# Patient Record
Sex: FEMALE | Race: ASIAN
[De-identification: names, ages, dates, MRNs, and addresses within clinical notes are randomized per-mention and may not be internally consistent; named-entity substitution may affect disease eponyms.]

---

## 2019-01-15 NOTE — CONS
DATE OF CONSULTATION:  01/15/2019



ATTENDING PHYSICIAN:  Dr. Hill.



REASON FOR CONSULTATION:  The patient seen in pulmonary consultation at the

request of Dr. Hill for hypoxemia.



HISTORY OF PRESENT ILLNESS:  The patient is a 73-year-old female that was seen

by Dr. Hill in the office for increasing shortness of breath and cough.  She

was apparently exposed to RSV.  She became increasingly more short of breath. 

She normally wears 2 liters of oxygen supplementation.  She was found to have O2

saturation of 77% on room air.  She was admitted.  I was asked to see her in

consultation.  Denies fever, chills, nausea, vomiting.



PAST MEDICAL AND PAST SURGICAL HISTORY:  Type 2 diabetes, hypertension, coronary

artery bypass grafting, previous cardiac stenting.



REVIEW OF SYSTEMS:

CONSTITUTIONAL:  No fever or chills.

EYES:  No change in visual acuity.

HEENT:  No nasal congestion or sore throat.

PULMONARY:  As indicated above.

CARDIOVASCULAR:  No chest pain or pressure.

GASTROINTESTINAL:  No nausea, vomiting, diarrhea.

GENITOURINARY:  No dysuria or frequency.



CURRENT MEDICATIONS:  List was reviewed.



ALLERGIES:  No known drug allergies.



PHYSICAL EXAMINATION:

GENERAL:  The patient was in no respiratory distress, normally on 2 liters of

oxygen supplementation.

VITAL SIGNS:  She is up to 4 liters now.

HEENT:  Eyes, the sclerae were nonicteric.

NECK:  Jugular venous distention was not elevated.  No lymphadenopathy.

CHEST:  Full expansion.

LUNGS:  Coarse breath sounds, expiratory wheeze.

CARDIOVASCULAR:  Regular rate and rhythm with S1, S2, no S3.

ABDOMEN:  Soft, nontender, nondistended.

EXTREMITIES:  No clubbing, cyanosis or edema.

NEUROLOGIC:  The patient was awake, alert, following commands.  A detailed neuro

exam was not performed.



LABORATORY DATA:  Reviewed.  White count was normal.  Electrolytes were noted. 

Chest x-ray:  No acute infiltrate.



IMPRESSION:

1.  Acute on chronic hypoxemic respiratory failure.

2.  Acute nonspecific bronchitis.

3.  Recent RSV exposure.

4.  Significant wheezing, dyspnea secondary to above.



PLAN:

1.  Continue current support with oxygen supplementation.

2.  Nebulized treatments.

3.  Steroids.

4.  DVT prophylaxis.

5.  Continue home meds.



I do appreciate the privilege in sharing in this patient's care.

 



______________________________

GELY GROVER MD



DR:  ZOË/demarco  JOB#:  [id number] / [id number]

DD:  01/15/2019 17:36  DT:  01/15/2019 22:03

## 2019-01-15 NOTE — RAD
Portable chest, 1/15/2019:

 

HISTORY: Chest pain, shortness of breath

 

Comparison is made to a study from 2/26/2016. There has been a previous 

median sternotomy. The heart is mildly enlarged. There is calcific 

plaquing of the aorta. The pulmonary vascularity is normal. No pulmonary 

infiltrate is seen. There is no evidence of pleural fluid. Moderate 

spurring is present in the spine.

 

IMPRESSION:

1. Cardiomegaly and aortic atherosclerosis.

2. No acute cardiopulmonary abnormality is detected.

 

Electronically signed by: Rick Moritz, MD (1/15/2019 9:53 AM) [facility]

## 2019-01-15 NOTE — PDOC
PULMONARY PROGRESS NOTES


Vitals





Vital Signs








  Date Time  Temp Pulse Resp B/P (MAP) Pulse Ox O2 Delivery O2 Flow Rate FiO2


 


1/15/19 16:00 97.9 70 16 151/55 (87) 96   





 97.9       


 


1/15/19 14:07      Nasal Cannula 3.5 








Labs





Laboratory Tests








Test


 1/15/19


10:05 1/15/19


10:51 1/15/19


14:20 1/15/19


16:00


 


Influenza Type A Antigen


 Negative


(NEGATIVE) 


 


 





 


Influenza Type B Antigen


 Negative


(NEGATIVE) 


 


 





 


White Blood Count


 


 7.4 x10^3/uL


(4.0-11.0) 


 





 


Red Blood Count


 


 5.75 x10^6/uL


(3.50-5.40) 


 





 


Hemoglobin


 


 16.6 g/dL


(12.0-15.5) 


 





 


Hematocrit


 


 49.7 %


(36.0-47.0) 


 





 


Mean Corpuscular Volume  86 fL ()   


 


Mean Corpuscular Hemoglobin  29 pg (25-35)   


 


Mean Corpuscular Hemoglobin


Concent 


 33 g/dL


(31-37) 


 





 


Red Cell Distribution Width


 


 14.8 %


(11.5-14.5) 


 





 


Platelet Count


 


 97 x10^3/uL


(140-400) 


 





 


Neutrophils (%) (Auto)  78 % (31-73)   


 


Lymphocytes (%) (Auto)  12 % (24-48)   


 


Monocytes (%) (Auto)  9 % (0-9)   


 


Eosinophils (%) (Auto)  1 % (0-3)   


 


Basophils (%) (Auto)  0 % (0-3)   


 


Neutrophils # (Auto)


 


 5.8 x10^3uL


(1.8-7.7) 


 





 


Lymphocytes # (Auto)


 


 0.8 x10^3/uL


(1.0-4.8) 


 





 


Monocytes # (Auto)


 


 0.7 x10^3/uL


(0.0-1.1) 


 





 


Eosinophils # (Auto)


 


 0.1 x10^3/uL


(0.0-0.7) 


 





 


Basophils # (Auto)


 


 0.0 x10^3/uL


(0.0-0.2) 


 





 


Sodium Level


 


 141 mmol/L


(136-145) 


 





 


Potassium Level


 


 3.9 mmol/L


(3.5-5.1) 


 





 


Chloride Level


 


 101 mmol/L


() 


 





 


Carbon Dioxide Level


 


 31 mmol/L


(21-32) 


 





 


Anion Gap  9 (6-14)   


 


Blood Urea Nitrogen


 


 20 mg/dL


(7-20) 


 





 


Creatinine


 


 1.3 mg/dL


(0.6-1.0) 


 





 


Estimated GFR


(Cockcroft-Gault) 


 40.2 


 


 





 


BUN/Creatinine Ratio  15 (6-20)   


 


Glucose Level


 


 167 mg/dL


(70-99) 


 





 


Lactic Acid Level


 


 1.7 mmol/L


(0.4-2.0) 


 





 


Calcium Level


 


 8.8 mg/dL


(8.5-10.1) 


 





 


Magnesium Level


 


 1.8 mg/dL


(1.8-2.4) 


 





 


Total Bilirubin


 


 1.4 mg/dL


(0.2-1.0) 


 





 


Aspartate Amino Transf


(AST/SGOT) 


 25 U/L (15-37) 


 


 





 


Alanine Aminotransferase


(ALT/SGPT) 


 26 U/L (14-59) 


 


 





 


Alkaline Phosphatase


 


 78 U/L


() 


 





 


Troponin I Quantitative


 


 0.085 ng/mL


(0.000-0.055) 


 0.063 ng/mL


(0.000-0.055)


 


NT-Pro-B-Type Natriuretic


Peptide 


 890 pg/mL


(0-124) 


 





 


Total Protein


 


 7.4 g/dL


(6.4-8.2) 


 





 


Albumin


 


 3.4 g/dL


(3.4-5.0) 


 





 


Albumin/Globulin Ratio  0.9 (1.0-1.7)   


 


Triglycerides Level


 


 144 mg/dL


(0-150) 


 





 


Cholesterol Level


 


 252 mg/dL


(0-200) 


 





 


LDL Cholesterol, Calculated


 


 177 mg/dL


(0-100) 


 





 


VLDL Cholesterol, Calculated


 


 29 mg/dL


(0-40) 


 





 


Non-HDL Cholesterol Calculated


 


 206 mg/dL


(0-129) 


 





 


HDL Cholesterol


 


 46 mg/dL


(40-60) 


 





 


Cholesterol/HDL Ratio  5.5   


 


Thyroid Stimulating Hormone


(TSH) 


 1.079 uIU/mL


(0.358-3.74) 


 





 


Urine Collection Type   Unknown  


 


Urine Color   Jesusita  


 


Urine Clarity   Clear  


 


Urine pH   6.0  


 


Urine Specific Gravity   >=1.030  


 


Urine Protein


 


 


 >=300 mg/dL


(NEG-TRACE) 





 


Urine Glucose (UA)


 


 


 Negative mg/dL


(NEG) 





 


Urine Ketones (Stick)


 


 


 Negative mg/dL


(NEG) 





 


Urine Blood   Trace (NEG)  


 


Urine Nitrite   Negative (NEG)  


 


Urine Bilirubin   Negative (NEG)  


 


Urine Urobilinogen Dipstick


 


 


 1.0 mg/dL (0.2


mg/dL) 





 


Urine Leukocyte Esterase   Negative (NEG)  


 


Urine RBC   Occ /HPF (0-2)  


 


Urine WBC   Occ /HPF (0-4)  


 


Urine Squamous Epithelial


Cells 


 


 Many /LPF 


 





 


Urine Bacteria


 


 


 Few /HPF


(0-FEW) 





 


Urine Hyaline Casts   Few /HPF  


 


Urine Mucus   Marked /LPF  


 


Test


 1/15/19


17:00 


 


 





 


Glucose (Fingerstick)


 353 mg/dL


(70-99) 


 


 











Laboratory Tests








Test


 1/15/19


10:05 1/15/19


10:51 1/15/19


14:20 1/15/19


16:00


 


Influenza Type A Antigen


 Negative


(NEGATIVE) 


 


 





 


Influenza Type B Antigen


 Negative


(NEGATIVE) 


 


 





 


White Blood Count


 


 7.4 x10^3/uL


(4.0-11.0) 


 





 


Red Blood Count


 


 5.75 x10^6/uL


(3.50-5.40) 


 





 


Hemoglobin


 


 16.6 g/dL


(12.0-15.5) 


 





 


Hematocrit


 


 49.7 %


(36.0-47.0) 


 





 


Mean Corpuscular Volume  86 fL ()   


 


Mean Corpuscular Hemoglobin  29 pg (25-35)   


 


Mean Corpuscular Hemoglobin


Concent 


 33 g/dL


(31-37) 


 





 


Red Cell Distribution Width


 


 14.8 %


(11.5-14.5) 


 





 


Platelet Count


 


 97 x10^3/uL


(140-400) 


 





 


Neutrophils (%) (Auto)  78 % (31-73)   


 


Lymphocytes (%) (Auto)  12 % (24-48)   


 


Monocytes (%) (Auto)  9 % (0-9)   


 


Eosinophils (%) (Auto)  1 % (0-3)   


 


Basophils (%) (Auto)  0 % (0-3)   


 


Neutrophils # (Auto)


 


 5.8 x10^3uL


(1.8-7.7) 


 





 


Lymphocytes # (Auto)


 


 0.8 x10^3/uL


(1.0-4.8) 


 





 


Monocytes # (Auto)


 


 0.7 x10^3/uL


(0.0-1.1) 


 





 


Eosinophils # (Auto)


 


 0.1 x10^3/uL


(0.0-0.7) 


 





 


Basophils # (Auto)


 


 0.0 x10^3/uL


(0.0-0.2) 


 





 


Sodium Level


 


 141 mmol/L


(136-145) 


 





 


Potassium Level


 


 3.9 mmol/L


(3.5-5.1) 


 





 


Chloride Level


 


 101 mmol/L


() 


 





 


Carbon Dioxide Level


 


 31 mmol/L


(21-32) 


 





 


Anion Gap  9 (6-14)   


 


Blood Urea Nitrogen


 


 20 mg/dL


(7-20) 


 





 


Creatinine


 


 1.3 mg/dL


(0.6-1.0) 


 





 


Estimated GFR


(Cockcroft-Gault) 


 40.2 


 


 





 


BUN/Creatinine Ratio  15 (6-20)   


 


Glucose Level


 


 167 mg/dL


(70-99) 


 





 


Lactic Acid Level


 


 1.7 mmol/L


(0.4-2.0) 


 





 


Calcium Level


 


 8.8 mg/dL


(8.5-10.1) 


 





 


Magnesium Level


 


 1.8 mg/dL


(1.8-2.4) 


 





 


Total Bilirubin


 


 1.4 mg/dL


(0.2-1.0) 


 





 


Aspartate Amino Transf


(AST/SGOT) 


 25 U/L (15-37) 


 


 





 


Alanine Aminotransferase


(ALT/SGPT) 


 26 U/L (14-59) 


 


 





 


Alkaline Phosphatase


 


 78 U/L


() 


 





 


Troponin I Quantitative


 


 0.085 ng/mL


(0.000-0.055) 


 0.063 ng/mL


(0.000-0.055)


 


NT-Pro-B-Type Natriuretic


Peptide 


 890 pg/mL


(0-124) 


 





 


Total Protein


 


 7.4 g/dL


(6.4-8.2) 


 





 


Albumin


 


 3.4 g/dL


(3.4-5.0) 


 





 


Albumin/Globulin Ratio  0.9 (1.0-1.7)   


 


Triglycerides Level


 


 144 mg/dL


(0-150) 


 





 


Cholesterol Level


 


 252 mg/dL


(0-200) 


 





 


LDL Cholesterol, Calculated


 


 177 mg/dL


(0-100) 


 





 


VLDL Cholesterol, Calculated


 


 29 mg/dL


(0-40) 


 





 


Non-HDL Cholesterol Calculated


 


 206 mg/dL


(0-129) 


 





 


HDL Cholesterol


 


 46 mg/dL


(40-60) 


 





 


Cholesterol/HDL Ratio  5.5   


 


Thyroid Stimulating Hormone


(TSH) 


 1.079 uIU/mL


(0.358-3.74) 


 





 


Urine Collection Type   Unknown  


 


Urine Color   Jesusita  


 


Urine Clarity   Clear  


 


Urine pH   6.0  


 


Urine Specific Gravity   >=1.030  


 


Urine Protein


 


 


 >=300 mg/dL


(NEG-TRACE) 





 


Urine Glucose (UA)


 


 


 Negative mg/dL


(NEG) 





 


Urine Ketones (Stick)


 


 


 Negative mg/dL


(NEG) 





 


Urine Blood   Trace (NEG)  


 


Urine Nitrite   Negative (NEG)  


 


Urine Bilirubin   Negative (NEG)  


 


Urine Urobilinogen Dipstick


 


 


 1.0 mg/dL (0.2


mg/dL) 





 


Urine Leukocyte Esterase   Negative (NEG)  


 


Urine RBC   Occ /HPF (0-2)  


 


Urine WBC   Occ /HPF (0-4)  


 


Urine Squamous Epithelial


Cells 


 


 Many /LPF 


 





 


Urine Bacteria


 


 


 Few /HPF


(0-FEW) 





 


Urine Hyaline Casts   Few /HPF  


 


Urine Mucus   Marked /LPF  


 


Test


 1/15/19


17:00 


 


 





 


Glucose (Fingerstick)


 353 mg/dL


(70-99) 


 


 











Medications





Active Scripts








 Medications  Dose


 Route/Sig


 Max Daily Dose Days Date Category Dose


Instructions


 


 Vitamin D


  (Cholecalciferol


  (Vitamin D3))


 50,000 Unit


 Capsule  50,000 Unit


 PO WEEKLY


  30 1/15/19 Rx 


 


 Spiriva


  (Tiotropium


 Bromide) 18 Mcg


 Cap.w.dev  1 Cap


 IH DAILY


   1/15/19 Rx 


 


 Claritin


  (Loratadine) 10


 Mg Tablet  1 Tab


 PO DAILY


   1/15/19 Rx 


 


 Losartan


 Potassium 50 Mg


 Tablet  50 Mg


 PO DAILY


  30 1/15/19 Rx 


 


 Lasix


  (Furosemide) 20


 Mg Tablet  1 Tab


 PO DAILY


   1/15/19 Rx 


 


 Atorvastatin


 Calcium 40 Mg


 Tablet  1 Tab


 PO DAILY


   1/15/19 Rx 


 


 Vitamin D2


  (Ergocalciferol


  (Vitamin D2))


 50,000 Unit


 Capsule  1 Cap


 PO WEEKLY


   1/15/19 Reported 


 


 Proair Hfa


  (Albuterol


 Sulfate) 8.5 Gm


 Hfa.aer.ad  1 Puff


 INH QID PRN


   1/15/19 Reported 


 


 Januvia


  (Sitagliptin


 Phosphate) 50 Mg


 Tablet  1 Tab


 PO DAILY


   1/15/19 Reported 


 


 Metoprolol


 Tartrate 25 Mg


 Tablet  25 Mg


 PO BID


   2/28/16 Reported 


 


 Aspir 81


  (Aspirin) 81 Mg


 Tablet.dr  1 Tab


 PO DAILY


   2/28/16 Reported 


 


 Antivert


  (Meclizine Hcl)


 12.5 Mg Tablet  25 Mg


 PO PRN TID PRN


   2/28/16 Rx 


 


 Glipizide Er


  (Glipizide) 2.5


 Mg Tab.er.24  4 Tab


 PO DAILY


   2/26/16 Reported  Gave this morning


 Take again tomorrow morning


 


 Gabapentin **


  (Gabapentin) 100


 Mg Capsule  100 Mg


 PO PRN Q8HRS PRN


   2/26/16 Reported  Not given on this admission


 Take as previously directed


 


 Metformin Hcl Er


  (Metformin Hcl)


 500 Mg Tab.er.24h  2 Tab


 PO BID


   2/26/16 Reported  Gave this morning


 Take again tonight











Impression


.


DICTATED


AECOPD


AGREE WITHC CURRENT RX


THANKS











GELY GROVER MD Jonas 15, 2019 17:39

## 2019-01-15 NOTE — PHYS DOC
Past Medical History


Past Medical History:  Diabetes-Type II, Hypertension


Past Surgical History:  Coronary Bypass Surgery, Other


Additional Past Surgical Histo:  Cardiac Stents


Alcohol Use:  None


Drug Use:  None





Adult General


Chief Complaint


Chief Complaint:  SHORTNESS OF BREATH





HPI


HPI





73-year-old female was sent to the ER by her primary care physician for 

complaints of shortness of air and productive cough. Per daughter patient over 

the past few days has had increased shortness of air and was exposed to RSV. 

Patient has history of COPD and uses O2 PRN- she arrived with no oxygen on an 

initial O2 sat was 77% on room air. Pt denies CP, fever, N/V/D, or body aches.  





Pt's dgtr is translating as pt speaks no English.





Review of Systems


Review of Systems





Constitutional: Denies fever or chills []


Eyes: Denies change in visual acuity, redness, or eye pain []


HENT: Denies nasal congestion or sore throat []


Respiratory: Reports cough/SOA


Cardiovascular: Denies CP


GI: Denies abdominal pain, nausea, vomiting, bloody stools or diarrhea []


: Denies dysuria or hematuria []


Musculoskeletal: Denies back/neck pain or joint pain []


Integument: Denies rash, swelling or skin lesions []


Neurologic: Denies headache, focal weakness or sensory changes []


Endocrine: Denies polyuria or polydipsia []





All other systems were reviewed and found to be within normal limits, except as 

documented in this note.





Current Medications


Current Medications





Current Medications








 Medications


  (Trade)  Dose


 Ordered  Sig/Kenroy  Start Time


 Stop Time Status Last Admin


Dose Admin


 


 Albuterol/


 Ipratropium


  (Duoneb)  3 ml  1X  ONCE  1/15/19 11:15


 1/15/19 11:16 DC 1/15/19 11:17


3 ML


 


 Aspirin


  (Children'S


 Aspirin)  324 mg  1X  ONCE  1/15/19 11:45


 1/15/19 11:46 DC 1/15/19 12:00


324 MG


 


 Doxycycline


 Hyclate


  (Vibra-Tab)  100 mg  1X  ONCE  1/15/19 11:45


 1/15/19 11:46 DC 1/15/19 12:00


100 MG


 


 Methylprednisolone


 Sodium Succinate


  (SOLU-Medrol


 125MG VIAL)  125 mg  1X  ONCE  1/15/19 09:45


 1/15/19 09:46 DC 1/15/19 10:49


125 MG











Allergies


Allergies





Allergies








Coded Allergies Type Severity Reaction Last Updated Verified


 


  No Known Drug Allergies    2/26/16 No











Physical Exam


Physical Exam





Constitutional: Well developed, well nourished, no acute distress, non-toxic 

appearance. []


HENT: Normocephalic, atraumatic, bilateral ears normal, oropharynx moist, no 

oral exudates, nose normal. []


Eyes: Pupils equal, conjunctiva normal, no discharge. [] 


Neck: Normal range of motion, no tenderness, supple, no stridor. [] 


Cardiovascular: Heart rate regular rhythm, no murmur []


Lungs & Thorax:  Coarse rhonchi bilat. upper lung fields with slight expiratory 

wheeze rt upper lobe- diminished in bases. Resp. equal/nonlabored. Occasional 

dry cough during exam


Abdomen: Bowel sounds normal, soft, no tenderness


Skin: Warm, dry, no erythema, no rash. [] 


Back: No tenderness, no CVA tenderness. [] 


Extremities: No tenderness, no cyanosis, no clubbing, ROM intact, no edema. [] 


Neurologic: Alert and oriented X 3, normal motor function, normal sensory 

function, no focal deficits noted. []


Psychologic: Affect normal, judgement normal, mood normal. []





Current Patient Data


Vital Signs





 Vital Signs








  Date Time  Temp Pulse Resp B/P (MAP) Pulse Ox O2 Delivery O2 Flow Rate FiO2


 


1/15/19 11:45  68 20 175/86 (115) 95 Nasal Cannula 3.5 


 


1/15/19 09:44 99.7       





 99.7       








Lab Values





 Laboratory Tests








Test


 1/15/19


10:05 1/15/19


10:51


 


Influenza Type A Antigen


 Negative


(NEGATIVE) 





 


Influenza Type B Antigen


 Negative


(NEGATIVE) 





 


White Blood Count


 


 7.4 x10^3/uL


(4.0-11.0)


 


Red Blood Count


 


 5.75 x10^6/uL


(3.50-5.40)  H


 


Hemoglobin


 


 16.6 g/dL


(12.0-15.5)  H


 


Hematocrit


 


 49.7 %


(36.0-47.0)  H


 


Mean Corpuscular Volume


 


 86 fL ()





 


Mean Corpuscular Hemoglobin  29 pg (25-35)  


 


Mean Corpuscular Hemoglobin


Concent 


 33 g/dL


(31-37)


 


Red Cell Distribution Width


 


 14.8 %


(11.5-14.5)  H


 


Platelet Count


 


 97 x10^3/uL


(140-400)  L


 


Neutrophils (%) (Auto)  78 % (31-73)  H


 


Lymphocytes (%) (Auto)  12 % (24-48)  L


 


Monocytes (%) (Auto)  9 % (0-9)  


 


Eosinophils (%) (Auto)  1 % (0-3)  


 


Basophils (%) (Auto)  0 % (0-3)  


 


Neutrophils # (Auto)


 


 5.8 x10^3uL


(1.8-7.7)


 


Lymphocytes # (Auto)


 


 0.8 x10^3/uL


(1.0-4.8)  L


 


Monocytes # (Auto)


 


 0.7 x10^3/uL


(0.0-1.1)


 


Eosinophils # (Auto)


 


 0.1 x10^3/uL


(0.0-0.7)


 


Basophils # (Auto)


 


 0.0 x10^3/uL


(0.0-0.2)


 


Sodium Level


 


 141 mmol/L


(136-145)


 


Potassium Level


 


 3.9 mmol/L


(3.5-5.1)


 


Chloride Level


 


 101 mmol/L


()


 


Carbon Dioxide Level


 


 31 mmol/L


(21-32)


 


Anion Gap  9 (6-14)  


 


Blood Urea Nitrogen


 


 20 mg/dL


(7-20)


 


Creatinine


 


 1.3 mg/dL


(0.6-1.0)  H


 


Estimated GFR


(Cockcroft-Gault) 


 40.2  





 


BUN/Creatinine Ratio  15 (6-20)  


 


Glucose Level


 


 167 mg/dL


(70-99)  H


 


Lactic Acid Level


 


 1.7 mmol/L


(0.4-2.0)


 


Calcium Level


 


 8.8 mg/dL


(8.5-10.1)


 


Magnesium Level


 


 1.8 mg/dL


(1.8-2.4)


 


Total Bilirubin


 


 1.4 mg/dL


(0.2-1.0)  H


 


Aspartate Amino Transferase


(AST) 


 25 U/L (15-37)





 


Alanine Aminotransferase (ALT)


 


 26 U/L (14-59)





 


Alkaline Phosphatase


 


 78 U/L


()


 


Troponin I Quantitative


 


 0.085 ng/mL


(0.000-0.055)


 


NT-Pro-B-Type Natriuretic


Peptide 


 890 pg/mL


(0-124)  H


 


Total Protein


 


 7.4 g/dL


(6.4-8.2)


 


Albumin


 


 3.4 g/dL


(3.4-5.0)


 


Albumin/Globulin Ratio


 


 0.9 (1.0-1.7)


L


 


Triglycerides Level


 


 144 mg/dL


(0-150)


 


Cholesterol Level


 


 252 mg/dL


(0-200)  H


 


LDL Cholesterol, Calculated


 


 177 mg/dL


(0-100)  H


 


VLDL Cholesterol, Calculated


 


 29 mg/dL


(0-40)


 


Non-HDL Cholesterol Calculated


 


 206 mg/dL


(0-129)  H


 


HDL Cholesterol


 


 46 mg/dL


(40-60)


 


Cholesterol/HDL Ratio  5.5  


 


Thyroid Stimulating Hormone


(TSH) 


 1.079 uIU/mL


(0.358-3.74)





 Laboratory Tests


1/15/19 10:51








 Laboratory Tests


1/15/19 10:51











EKG


EKG


EKG obtained 01/15/19 at 1008


Interpreted by Dr. Mora





Sinus rhythm


Rate 70


No STEMI





Radiology/Procedures


Radiology/Procedures


PROCEDURE: CHEST AP ONLY





Portable chest, 1/15/2019:


 


HISTORY: Chest pain, shortness of breath


 


Comparison is made to a study from 2/26/2016. There has been a previous 


median sternotomy. The heart is mildly enlarged. There is calcific 


plaquing of the aorta. The pulmonary vascularity is normal. No pulmonary 


infiltrate is seen. There is no evidence of pleural fluid. Moderate 


spurring is present in the spine.


 


IMPRESSION:


1. Cardiomegaly and aortic atherosclerosis.


2. No acute cardiopulmonary abnormality is detected.


 


Electronically signed by: Rick Moritz, MD (1/15/2019 9:53 AM) [facility]














DICTATED and SIGNED BY:     MORITZ,RICK S MD


DATE:     01/15/19 0952





Course & Med Decision Making


Course & Med Decision Making


Pertinent Labs and Imaging studies reviewed. (See chart for details)





1200: Patient was sent to the ER by her primary care physician for complaints 

of shortness of air. Patient has had coarse lung sounds in upper lung fields 

with rotatory wheeze. Patient arrived to the ER without oxygen and initial 

saturation was 66-70%. Patient was placed on O2 via nasal cannula at 3 L and 

oxygenation improved to 88-90%. Patient was given 2 DuoNeb treatments and IV 

Solu-Medrol 125 mg. Patient had EKG with no acute ST elevation or STEMI her 

troponin was elevated at 0.085-with patient denying any chest pain. Chest x-ray 

with "Cardiomegaly and aortic atherosclerosis. No acute cardiopulmonary 

abnormality is detected" per report. Flu test neg. Patient had no improvement 

in lung sounds following treatments and so with elevated troponin discussed 

test results and plans for admission with patient's daughter who translated for 

pt. Pt will be given 324 mg of aspirin. Patient will also be started on 

doxycycline. Blood cultures were obtained. Patient had normal limits lactic at 

1.7 WBCs normal limits at 7.4 with platelets down at 97 bands on differential. 





1257: Spoke with Dr. Todd who called back for Dr. Hill pt's PCP- discussed pt

's case and admit plan. Will admit to [facility] for further monitoring. Will consult 

cardiology with admit orders for elevated troponin. Or


Staff Physician Addendum:


I was working in the ER during the course of this patient's visit.  I was 

available for consultation as needed, but I was not directly involved in the 

care of this patient.    I








Ronalon Disclaimer


Dragon Disclaimer


This electronic medical record was generated, in whole or in part, using a 

voice recognition dictation system.





Departure


Departure


Impression:  


 Primary Impression:  


 Dyspnea


 Additional Impressions:  


 COPD exacerbation


 Elevated troponin


Disposition:  09 ADMITTED AS INPATIENT


Admitting Physician:  Ivanna Hill


Condition:  STABLE


Referrals:  


IVANNA HILL MD (PCP)


Scripts


Cholecalciferol (Vitamin D3) (Vitamin D) 50,000 Unit Capsule


[address]21 UNIT PO WEEKLY for replacement for 30 Days, #3 CAP


   Prov: NADIYA GALLARDO MD         1/15/19 


Tiotropium Bromide (SPIRIVA) 18 Mcg Cap.w.dev


1 CAP IH DAILY for lungs, #30 CAP 3 Refills


   Prov: NADIYA GALLARDO MD         1/15/19 


Loratadine (CLARITIN) 10 Mg Tablet


1 TAB PO DAILY for allergies, #30 TAB 5 Refills


   Prov: NADIYA GALLARDO MD         1/15/19 


Losartan Potassium (LOSARTAN POTASSIUM) 50 Mg Tablet


50 MG PO DAILY for HYPERTENSION for 30 Days, #30 TAB


   Prov: NADIYA GALLARDO MD         1/15/19 


Furosemide (LASIX) 20 Mg Tablet


1 TAB PO DAILY for chf, #90 TAB 1 Refill


   Prov: NADIYA GALLARDO MD         1/15/19 


Atorvastatin Calcium (ATORVASTATIN CALCIUM) 40 Mg Tablet


1 TAB PO DAILY for cholesterol, #30 TAB 5 Refills


   Prov: NADIYA GALLARDO MD         1/15/19





Problem Qualifiers











ABAD ARGUETA Jonas 15, 2019 09:45


BATSHEVA MORA MD Jan 16, 2019 06:05

## 2019-01-15 NOTE — EKG
[facility]

              [address] [place] [address]

Test Date:    2019-01-15               Test Time:    10:08:57

Pat Name:     GINA JETT                 Department:   

Patient ID:   [id number]           Room:          

Gender:       F                        Technician:   

:          1945               Requested By: ABAD ARGUETA

Order Number: [id number]           Reading MD:   Brayan Harrington MD

                                 Measurements

Intervals                              Axis          

Rate:         69                       P:            

NH:                                    QRS:          25

QRSD:         82                       T:            41

QT:           414                                    

QTc:          450                                    

                           Interpretive Statements

SR

NON-SPECIFIC ST/T CHANGES

Electronically Signed On 1- 12:34:58 CST by Brayan Harrington MD

## 2019-01-15 NOTE — PDOC2
ZAC RANGEL APRN 1/15/19 1501:


CARDIAC CONSULT


DATE OF CONSULT


Date of Consult


DATE: 1/15/19 


TIME: 14:47





REASON FOR CONSULT


Reason for Consult:


elevated troponin





REFERRING PHYSICIAN


Referring Physician:


cyril





SOURCE


Source:  Caregiver (daughter), Chart review, Patient





HISTORY OF PRESENT ILLNESS


HISTORY OF PRESENT ILLNESS


This is a 72 yo female admitted for complains of SOA and productive cough with 

white sputum.  She has been exposed to RSV from her 1 yo nephew recently. Her 

SOA and cough started about Thursday last week and was getting worse. No 

reported fever. She also has COPD from significant secondary tobacco exposure 

at her younger years and has not been routinely using her advair and albuterol. 

Denies any chest pain or SOA prior to this event.  She has been having 

intermittent shapr kathleen pain since her cough started and again reproducible 

with cough.  Presently her SOA is better and no CP.  she has had aortic 

dissection repair and CABG in 2010 and has not seen her  cardiologist in a 

while otherwise she has been complaint with her medications.





PAST MEDICAL HISTORY


Cardiovascular:  CAD, HTN, Hyperlipidemia, Other (aortic dissection with repair 

in 2010)


CENTRAL NERVOUS SYSTEM:  Periperal neuropathy, Vertigo, Other (meningioma)


GI:  No pertinent hx


Musculoskeletal:  Osteoarthritis


Infectious disease:  No pertinent hx


ENT:  No pertinent hx


Renal/:  Chronic renal insuff (?)


Endocrine:  Diabetes (2)


Dermatology:  No pertinent hx





PAST SURGICAL HISTORY


Past Surgical History:  CABG, Other (PCI/stent)





FAMILY HISTORY


Family History


noncontributory





SOCIAL HISTORY


Smoke:  No


ALCOHOL:  none


Drugs:  None


Lives:  with Family





CURRENT MEDICATIONS


CURRENT MEDICATIONS





Current Medications








 Medications


  (Trade)  Dose


 Ordered  Sig/Kenroy


 Route


 PRN Reason  Start Time


 Stop Time Status Last Admin


Dose Admin


 


 Albuterol/


 Ipratropium


  (Duoneb)  3 ml  1X  ONCE


 NEB


   1/15/19 09:45


 1/15/19 09:46 DC 1/15/19 09:54





 


 Methylprednisolone


 Sodium Succinate


  (SOLU-Medrol


 125MG VIAL)  125 mg  1X  ONCE


 IV


   1/15/19 09:45


 1/15/19 09:46 DC 1/15/19 10:49





 


 Albuterol/


 Ipratropium


  (Duoneb)  3 ml  1X  ONCE


 NEB


   1/15/19 11:15


 1/15/19 11:16 DC 1/15/19 11:17





 


 Doxycycline


 Hyclate


  (Vibra-Tab)  100 mg  1X  ONCE


 PO


   1/15/19 11:45


 1/15/19 11:46 DC 1/15/19 12:00





 


 Aspirin


  ([facility]


 Aspirin)  324 mg  1X  ONCE


 PO


   1/15/19 11:45


 1/15/19 11:46 DC 1/15/19 12:00














ALLERGIES


ALLERGIES:  


Coded Allergies:  


     No Known Drug Allergies (Unverified , 2/26/16)





ROS


Review of System


limited: PATRICIA, language barrier





PHYSICAL EXAM


General:  Alert, Oriented X3, Cooperative, mild distress


HEENT:  Atraumatic, Mucous membr. moist/pink


Lungs:  Other (faint wheeze)


Heart:  Regular rate (SR), Normal S1, Normal S2, No murmurs


Abdomen:  Soft, No tenderness


Extremities:  No cyanosis, No edema


Skin:  No breakdown, No significant lesion


Neuro:  Normal speech, Sensation intact


Psych/Mental Status:  Mental status NL, Mood NL


MUSCULOSKELETAL:  Osteoarthritic changes both hands





VITALS


VITALS





Vital Signs








  Date Time  Temp Pulse Resp B/P (MAP) Pulse Ox O2 Delivery O2 Flow Rate FiO2


 


1/15/19 14:07  74 20 169/86 (113) 98 Nasal Cannula 3.5 


 


1/15/19 09:44 99.7       





 99.7       











LABS


Lab:





Laboratory Tests








Test


 1/15/19


10:05 1/15/19


10:51 1/15/19


14:20


 


Influenza Type A Antigen


 Negative


(NEGATIVE) 


 





 


Influenza Type B Antigen


 Negative


(NEGATIVE) 


 





 


White Blood Count


 


 7.4 x10^3/uL


(4.0-11.0) 





 


Red Blood Count


 


 5.75 x10^6/uL


(3.50-5.40) 





 


Hemoglobin


 


 16.6 g/dL


(12.0-15.5) 





 


Hematocrit


 


 49.7 %


(36.0-47.0) 





 


Mean Corpuscular Volume  86 fL ()  


 


Mean Corpuscular Hemoglobin  29 pg (25-35)  


 


Mean Corpuscular Hemoglobin


Concent 


 33 g/dL


(31-37) 





 


Red Cell Distribution Width


 


 14.8 %


(11.5-14.5) 





 


Platelet Count


 


 97 x10^3/uL


(140-400) 





 


Neutrophils (%) (Auto)  78 % (31-73)  


 


Lymphocytes (%) (Auto)  12 % (24-48)  


 


Monocytes (%) (Auto)  9 % (0-9)  


 


Eosinophils (%) (Auto)  1 % (0-3)  


 


Basophils (%) (Auto)  0 % (0-3)  


 


Neutrophils # (Auto)


 


 5.8 x10^3uL


(1.8-7.7) 





 


Lymphocytes # (Auto)


 


 0.8 x10^3/uL


(1.0-4.8) 





 


Monocytes # (Auto)


 


 0.7 x10^3/uL


(0.0-1.1) 





 


Eosinophils # (Auto)


 


 0.1 x10^3/uL


(0.0-0.7) 





 


Basophils # (Auto)


 


 0.0 x10^3/uL


(0.0-0.2) 





 


Sodium Level


 


 141 mmol/L


(136-145) 





 


Potassium Level


 


 3.9 mmol/L


(3.5-5.1) 





 


Chloride Level


 


 101 mmol/L


() 





 


Carbon Dioxide Level


 


 31 mmol/L


(21-32) 





 


Anion Gap  9 (6-14)  


 


Blood Urea Nitrogen


 


 20 mg/dL


(7-20) 





 


Creatinine


 


 1.3 mg/dL


(0.6-1.0) 





 


Estimated GFR


(Cockcroft-Gault) 


 40.2 


 





 


BUN/Creatinine Ratio  15 (6-20)  


 


Glucose Level


 


 167 mg/dL


(70-99) 





 


Lactic Acid Level


 


 1.7 mmol/L


(0.4-2.0) 





 


Calcium Level


 


 8.8 mg/dL


(8.5-10.1) 





 


Magnesium Level


 


 1.8 mg/dL


(1.8-2.4) 





 


Total Bilirubin


 


 1.4 mg/dL


(0.2-1.0) 





 


Aspartate Amino Transf


(AST/SGOT) 


 25 U/L (15-37) 


 





 


Alanine Aminotransferase


(ALT/SGPT) 


 26 U/L (14-59) 


 





 


Alkaline Phosphatase


 


 78 U/L


() 





 


Troponin I Quantitative


 


 0.085 ng/mL


(0.000-0.055) 





 


NT-Pro-B-Type Natriuretic


Peptide 


 890 pg/mL


(0-124) 





 


Total Protein


 


 7.4 g/dL


(6.4-8.2) 





 


Albumin


 


 3.4 g/dL


(3.4-5.0) 





 


Albumin/Globulin Ratio  0.9 (1.0-1.7)  


 


Urine Collection Type   Unknown 


 


Urine Color   Jesusita 


 


Urine Clarity   Clear 


 


Urine pH   6.0 


 


Urine Specific Gravity   >=1.030 


 


Urine Protein


 


 


 >=300 mg/dL


(NEG-TRACE)


 


Urine Glucose (UA)


 


 


 Negative mg/dL


(NEG)


 


Urine Ketones (Stick)


 


 


 Negative mg/dL


(NEG)


 


Urine Blood   Trace (NEG) 


 


Urine Nitrite   Negative (NEG) 


 


Urine Bilirubin   Negative (NEG) 


 


Urine Urobilinogen Dipstick


 


 


 1.0 mg/dL (0.2


mg/dL)


 


Urine Leukocyte Esterase   Negative (NEG) 


 


Urine RBC   Occ /HPF (0-2) 


 


Urine WBC   Occ /HPF (0-4) 


 


Urine Squamous Epithelial


Cells 


 


 Many /LPF 





 


Urine Bacteria


 


 


 Few /HPF


(0-FEW)


 


Urine Hyaline Casts   Few /HPF 


 


Urine Mucus   Marked /LPF 











ASSESSMENT/PLAN


ASSESSMENT/PLAN


1. AECOPD: recent RSV exposure.  Noncompliant with her inhalers. 


2. Elevated troponin: initial at 0.085, EKG SR no acute changes. Demand 

mediated due to hypoxia. 


3. Thrombocytopenia: possibly reactive. defer to PCP


4. HTN: controlled


5. HLP


6. DM2/DPN


7. Suspect CKD3 with macroalbuminuria


8. CAD: past CABG, clinically stable.


9. Pleuritic CP: reproducible with cough





Recommendations


1. TTE, lipids and TSH. 


2. Consult pulmonary. Solumedrol given in ED. 


3. Consider outpt stress test as an outpt unless significant changes to TTE. 

and will trend troponin 


4. Continue secondary prevention measures. 


5. May follow up with  cardiologist upon discharge.





ALESSIA VALVERDE MD 1/16/19 0915:


CARDIAC CONSULT


ASSESSMENT/PLAN


ASSESSMENT/PLAN


Patient seen and examined 1/15/19.  Agree with NP's assessment and plan.


Slight troponin elevation probably demand ischemia. CAD status clinically 

stable overall.


Check 2-D echo to assess LV function and rule out wall motion abnormalities.


Consider ischemic evaluation as an outpatient.


Continue current treatment for acute COPD exacerbation.


Thank you for your consultation.











ZAC RANGEL Jonas 15, 2019 15:01


ALESSIA VALVERDE MD Jan 16, 2019 09:15

## 2019-01-16 NOTE — PDOC3
DATE OF ADMISSION


Date of Admission


1/15/19





DATE OF DISCHARGE


Discharge Date


19





PROBLEM LIST


Problems:  


(1) COPD exacerbation


(2) Elevated troponin





CONSULTS


Consults


cardiology and pulmonary





PROCEDURES


Procedures


none





LABS


Labs





Laboratory Tests








Test


 1/15/19


16:00 1/15/19


17:00 1/15/19


19:00 1/15/19


20:56


 


Troponin I Quantitative


 0.063 ng/mL


(0.000-0.055) 


 0.068 ng/mL


(0.000-0.055) 





 


Glucose (Fingerstick)


 


 353 mg/dL


(70-99) 


 293 mg/dL


(70-99)


 


Test


 19


04:20 19


07:12 19


11:20 





 


White Blood Count


 5.4 x10^3/uL


(4.0-11.0) 


 


 





 


Red Blood Count


 5.43 x10^6/uL


(3.50-5.40) 


 


 





 


Hemoglobin


 15.9 g/dL


(12.0-15.5) 


 


 





 


Hematocrit


 46.9 %


(36.0-47.0) 


 


 





 


Mean Corpuscular Volume 86 fL ()    


 


Mean Corpuscular Hemoglobin 29 pg (25-35)    


 


Mean Corpuscular Hemoglobin


Concent 34 g/dL


(31-37) 


 


 





 


Red Cell Distribution Width


 14.9 %


(11.5-14.5) 


 


 





 


Platelet Count


 102 x10^3/uL


(140-400) 


 


 





 


Neutrophils (%) (Auto) 80 % (31-73)    


 


Lymphocytes (%) (Auto) 14 % (24-48)    


 


Monocytes (%) (Auto) 6 % (0-9)    


 


Eosinophils (%) (Auto) 0 % (0-3)    


 


Basophils (%) (Auto) 0 % (0-3)    


 


Neutrophils # (Auto)


 4.3 x10^3uL


(1.8-7.7) 


 


 





 


Lymphocytes # (Auto)


 0.8 x10^3/uL


(1.0-4.8) 


 


 





 


Monocytes # (Auto)


 0.3 x10^3/uL


(0.0-1.1) 


 


 





 


Eosinophils # (Auto)


 0.0 x10^3/uL


(0.0-0.7) 


 


 





 


Basophils # (Auto)


 0.0 x10^3/uL


(0.0-0.2) 


 


 





 


Sodium Level


 142 mmol/L


(136-145) 


 


 





 


Potassium Level


 3.9 mmol/L


(3.5-5.1) 


 


 





 


Chloride Level


 102 mmol/L


() 


 


 





 


Carbon Dioxide Level


 29 mmol/L


(21-32) 


 


 





 


Anion Gap 11 (6-14)    


 


Blood Urea Nitrogen


 35 mg/dL


(7-20) 


 


 





 


Creatinine


 1.8 mg/dL


(0.6-1.0) 


 


 





 


Estimated GFR


(Cockcroft-Gault) 27.6 


 


 


 





 


Glucose Level


 186 mg/dL


(70-99) 


 


 





 


Calcium Level


 9.2 mg/dL


(8.5-10.1) 


 


 





 


Glucose (Fingerstick)


 


 163 mg/dL


(70-99) 148 mg/dL


(70-99) 











Microbiology


1/15/19 Blood Culture - Preliminary, Resulted


          NO GROWTH AFTER 1 DAY





MEDICATIONS


Medications


Medications reviewed and reconciled for discharge.





CHEIF COMPLAINT


Cheif Complaint


She was brought to office by her daughter for SOA and was found to be 

significantly hypoxic despite a neb treatment and sent to ER and subsequently 

admitted.  She has had recent exposure to RSV.  She has oxygen at home and uses 

it intermittently.  In the ER her sats came up to 90 % on 2 liters of oxygen, 

she received Duoneb x 2 and IV solumedrol.  Overnight she has improved but 

remains on O2.  Her troponin was elevated initially which also contributed to 

her being hospitalized, she denied chest pain though





PAST MEDICAL HISTORY


PMH


type 2 diabetes with DPN


HTN


COPD


hypoxia


allergic rhinitis


AAA


Prevnar given 





PAST SURGICAL HISTORY


PSH


AAA dissection repair 2010





SOCIAL HISTORY


SH


never smoker, no drug use, , lives with  and daughter, [place]





FAMILY HISTORY


FH


parents , unknown cause, daughter healthy





ALLERGIES


Allergies





Allergies








Coded Allergies Type Severity Reaction Last Updated Verified


 


  No Known Drug Allergies    16 No











MEDICATIONS


Meds


Medications reviewed.





REVIEW OF SYSTEMS


ROS


A 14 point ROS was completed with the following noted as positive: see HPI





Other systems reviewed and negative.





PHYSICAL EXAM


Subjective


SOA


Objective


NAD, A&O


HEENT: no acute findings other than mild nasal congestion


Neck: supple, no adenopathy


Lungs: end expiratory wheezes, good respiratory effort, non labored


Heart: RRR, no murmur heard


Chest: no tenderness


Abdomen: soft and non tender, normal bowel sounds


Ext: no C/C/E


Skin: no rash, good turgor


MS: good tone and strength


Neuro: diminished sensation distal legs


Vital Signs





Vital Signs








  Date Time  Temp Pulse Resp B/P (MAP) Pulse Ox O2 Delivery O2 Flow Rate FiO2


 


19 11:18     98 Nasal Cannula 3.0 


 


19 11:09 98.2 68 20 137/77 (97)    





 98.2       








Assessment


Acute COPD exac, elevated troponin in a diabetic that has O2 at home, she has 

had recent RSV exposure





[facility] NOTE


[facility] Note


She has improved dramatically overnight with neb treatments, O2 and solumedrol, 

her blood sugar has been controlled, she has been evaluated by PT and strength 

is adequate.  She did have a slight rise in her creatinine overnight presumably 

from a dose of furosemide but is eating and drinking well now.  Cardiology and 

Pulmonary have seen her in consultation and have found no further need for 

hospitalization and recommending discharge.  She will be sent home on 12 day 

prednisone taper and Tessalon Perles





FOLLOW UP


F/U


1-2 weeks





DISPOSITION


Dispo


home. hold NADIYA Cueva MD 2019 14:39

## 2019-01-16 NOTE — PDOC
ZAC RANGEL APRN 1/16/19 1331:


CARDIO Progress Notes


Date and Time


Date of Service


1/16/2019


Time of Evaluation


1300





Subjective


Subjective:  No Chest Pain, No shortness of breath, No Palpitations, Other (SOA 

much better)





Vitals


Vitals





Vital Signs








  Date Time  Temp Pulse Resp B/P (MAP) Pulse Ox O2 Delivery O2 Flow Rate FiO2


 


1/16/19 11:18     98 Nasal Cannula 3.0 


 


1/16/19 11:09 98.2 68 20 137/77 (97)    





 98.2       








Weight


Weight [ ]





Input and Output


Intake and Output











Intake and Output 


 


 1/16/19





 07:01


 


Intake Total 700 ml


 


Output Total 150 ml


 


Balance 550 ml


 


 


 


Intake Oral 700 ml


 


Output Urine Total 150 ml


 


# Voids 1











Laboratory


Labs





Laboratory Tests








Test


 1/15/19


14:20 1/15/19


16:00 1/15/19


17:00 1/15/19


19:00


 


Urine Collection Type Unknown    


 


Urine Color Jesusita    


 


Urine Clarity Clear    


 


Urine pH 6.0    


 


Urine Specific Gravity >=1.030    


 


Urine Protein


 >=300 mg/dL


(NEG-TRACE) 


 


 





 


Urine Glucose (UA)


 Negative mg/dL


(NEG) 


 


 





 


Urine Ketones (Stick)


 Negative mg/dL


(NEG) 


 


 





 


Urine Blood Trace (NEG)    


 


Urine Nitrite Negative (NEG)    


 


Urine Bilirubin Negative (NEG)    


 


Urine Urobilinogen Dipstick


 1.0 mg/dL (0.2


mg/dL) 


 


 





 


Urine Leukocyte Esterase Negative (NEG)    


 


Urine RBC Occ /HPF (0-2)    


 


Urine WBC Occ /HPF (0-4)    


 


Urine Squamous Epithelial


Cells Many /LPF 


 


 


 





 


Urine Bacteria


 Few /HPF


(0-FEW) 


 


 





 


Urine Hyaline Casts Few /HPF    


 


Urine Mucus Marked /LPF    


 


Troponin I Quantitative


 


 0.063 ng/mL


(0.000-0.055) 


 0.068 ng/mL


(0.000-0.055)


 


Glucose (Fingerstick)


 


 


 353 mg/dL


(70-99) 





 


Test


 1/15/19


20:56 1/16/19


04:20 1/16/19


07:12 1/16/19


11:20


 


Glucose (Fingerstick)


 293 mg/dL


(70-99) 


 163 mg/dL


(70-99) 148 mg/dL


(70-99)


 


White Blood Count


 


 5.4 x10^3/uL


(4.0-11.0) 


 





 


Red Blood Count


 


 5.43 x10^6/uL


(3.50-5.40) 


 





 


Hemoglobin


 


 15.9 g/dL


(12.0-15.5) 


 





 


Hematocrit


 


 46.9 %


(36.0-47.0) 


 





 


Mean Corpuscular Volume  86 fL ()   


 


Mean Corpuscular Hemoglobin  29 pg (25-35)   


 


Mean Corpuscular Hemoglobin


Concent 


 34 g/dL


(31-37) 


 





 


Red Cell Distribution Width


 


 14.9 %


(11.5-14.5) 


 





 


Platelet Count


 


 102 x10^3/uL


(140-400) 


 





 


Neutrophils (%) (Auto)  80 % (31-73)   


 


Lymphocytes (%) (Auto)  14 % (24-48)   


 


Monocytes (%) (Auto)  6 % (0-9)   


 


Eosinophils (%) (Auto)  0 % (0-3)   


 


Basophils (%) (Auto)  0 % (0-3)   


 


Neutrophils # (Auto)


 


 4.3 x10^3uL


(1.8-7.7) 


 





 


Lymphocytes # (Auto)


 


 0.8 x10^3/uL


(1.0-4.8) 


 





 


Monocytes # (Auto)


 


 0.3 x10^3/uL


(0.0-1.1) 


 





 


Eosinophils # (Auto)


 


 0.0 x10^3/uL


(0.0-0.7) 


 





 


Basophils # (Auto)


 


 0.0 x10^3/uL


(0.0-0.2) 


 





 


Sodium Level


 


 142 mmol/L


(136-145) 


 





 


Potassium Level


 


 3.9 mmol/L


(3.5-5.1) 


 





 


Chloride Level


 


 102 mmol/L


() 


 





 


Carbon Dioxide Level


 


 29 mmol/L


(21-32) 


 





 


Anion Gap  11 (6-14)   


 


Blood Urea Nitrogen


 


 35 mg/dL


(7-20) 


 





 


Creatinine


 


 1.8 mg/dL


(0.6-1.0) 


 





 


Estimated GFR


(Cockcroft-Gault) 


 27.6 


 


 





 


Glucose Level


 


 186 mg/dL


(70-99) 


 





 


Calcium Level


 


 9.2 mg/dL


(8.5-10.1) 


 














Microbiology


Micro





Microbiology


1/15/19 Blood Culture - Preliminary, Resulted


          NO GROWTH AFTER 1 DAY





Physical Exam


HEENT:  Neck Supple W Full Motion


Chest:  Symmetric


LUNGS:  Other (diminished basilar wheeze. )


Heart:  S1S2, RRR (SR)


Abdomen:  Soft N/T


Extremities:  No Edema, No Calf Tenderness


Neurology:  alert, follow commands, other (language barrier)





Assessment


Assessment


1. AECOPD/moderate pulmonary HTN: recent RSV exposure.  Noncompliant with her 

inhalers. SOA better. per pulmonary


2. Elevated troponin: peaked at 0.085, EKG SR no acute changes. Demand mediated 

due to hypoxia. EF >70%


3. Thrombocytopenia: possibly reactive. defer to PCP


4. HTN: controlled


5. HLP: not on goal


6. DM2/DPN


7. BELLO on CKD3 with macroalbuminuria: prerenal. Cr at 1.8 per PCP


8. CAD: past CABG, clinically stable.


9. Pleuritic CP: reproducible with cough


10. Mild valvular insufficiency: mild AI/MR








Recommendations


1. Consider outpt stress test as an outpt and will defer to her  cardiologist


2. Increase lipitor


3. Continue secondary prevention measures. 


4. May follow up with  cardiologist upon discharge.





ALESSIA VALVERDE MD 1/16/19 2040:


CARDIO Progress Notes


Assessment


Assessment


Patient seen and examined. Agree with NP's assessment and plan


2D echo showed normal LVF without any WMA


Slight trop elevation probably demand ischemia


We will consider ischemic evaluation as outpatient











ZAC RANGEL Jan 16, 2019 13:31


ALESSIA VALVERDE MD Jan 16, 2019 20:40

## 2019-01-16 NOTE — PDOC
PULMONARY PROGRESS NOTES


Subjective


PT NOT MORE SOA


Vitals





Vital Signs








  Date Time  Temp Pulse Resp B/P (MAP) Pulse Ox O2 Delivery O2 Flow Rate FiO2


 


1/16/19 07:00 97.9 61 20 161/58 (92) 97 Nasal Cannula 3.5 





 97.9       








ROS:  No Nausea, No Chest Pain, No Abdominal Pain, No Increase Cough


Lungs:  Wheezing


Cardiovascular:  S1, S2


Abdomen:  Soft


Neuro Exam:  Alert


Extremities:  No Edema


Skin:  Warm


Labs





Laboratory Tests








Test


 1/15/19


10:05 1/15/19


10:51 1/15/19


14:20 1/15/19


16:00


 


Influenza Type A Antigen


 Negative


(NEGATIVE) 


 


 





 


Influenza Type B Antigen


 Negative


(NEGATIVE) 


 


 





 


White Blood Count


 


 7.4 x10^3/uL


(4.0-11.0) 


 





 


Red Blood Count


 


 5.75 x10^6/uL


(3.50-5.40) 


 





 


Hemoglobin


 


 16.6 g/dL


(12.0-15.5) 


 





 


Hematocrit


 


 49.7 %


(36.0-47.0) 


 





 


Mean Corpuscular Volume  86 fL ()   


 


Mean Corpuscular Hemoglobin  29 pg (25-35)   


 


Mean Corpuscular Hemoglobin


Concent 


 33 g/dL


(31-37) 


 





 


Red Cell Distribution Width


 


 14.8 %


(11.5-14.5) 


 





 


Platelet Count


 


 97 x10^3/uL


(140-400) 


 





 


Neutrophils (%) (Auto)  78 % (31-73)   


 


Lymphocytes (%) (Auto)  12 % (24-48)   


 


Monocytes (%) (Auto)  9 % (0-9)   


 


Eosinophils (%) (Auto)  1 % (0-3)   


 


Basophils (%) (Auto)  0 % (0-3)   


 


Neutrophils # (Auto)


 


 5.8 x10^3uL


(1.8-7.7) 


 





 


Lymphocytes # (Auto)


 


 0.8 x10^3/uL


(1.0-4.8) 


 





 


Monocytes # (Auto)


 


 0.7 x10^3/uL


(0.0-1.1) 


 





 


Eosinophils # (Auto)


 


 0.1 x10^3/uL


(0.0-0.7) 


 





 


Basophils # (Auto)


 


 0.0 x10^3/uL


(0.0-0.2) 


 





 


Sodium Level


 


 141 mmol/L


(136-145) 


 





 


Potassium Level


 


 3.9 mmol/L


(3.5-5.1) 


 





 


Chloride Level


 


 101 mmol/L


() 


 





 


Carbon Dioxide Level


 


 31 mmol/L


(21-32) 


 





 


Anion Gap  9 (6-14)   


 


Blood Urea Nitrogen


 


 20 mg/dL


(7-20) 


 





 


Creatinine


 


 1.3 mg/dL


(0.6-1.0) 


 





 


Estimated GFR


(Cockcroft-Gault) 


 40.2 


 


 





 


BUN/Creatinine Ratio  15 (6-20)   


 


Glucose Level


 


 167 mg/dL


(70-99) 


 





 


Lactic Acid Level


 


 1.7 mmol/L


(0.4-2.0) 


 





 


Calcium Level


 


 8.8 mg/dL


(8.5-10.1) 


 





 


Magnesium Level


 


 1.8 mg/dL


(1.8-2.4) 


 





 


Total Bilirubin


 


 1.4 mg/dL


(0.2-1.0) 


 





 


Aspartate Amino Transf


(AST/SGOT) 


 25 U/L (15-37) 


 


 





 


Alanine Aminotransferase


(ALT/SGPT) 


 26 U/L (14-59) 


 


 





 


Alkaline Phosphatase


 


 78 U/L


() 


 





 


Troponin I Quantitative


 


 0.085 ng/mL


(0.000-0.055) 


 0.063 ng/mL


(0.000-0.055)


 


NT-Pro-B-Type Natriuretic


Peptide 


 890 pg/mL


(0-124) 


 





 


Total Protein


 


 7.4 g/dL


(6.4-8.2) 


 





 


Albumin


 


 3.4 g/dL


(3.4-5.0) 


 





 


Albumin/Globulin Ratio  0.9 (1.0-1.7)   


 


Triglycerides Level


 


 144 mg/dL


(0-150) 


 





 


Cholesterol Level


 


 252 mg/dL


(0-200) 


 





 


LDL Cholesterol, Calculated


 


 177 mg/dL


(0-100) 


 





 


VLDL Cholesterol, Calculated


 


 29 mg/dL


(0-40) 


 





 


Non-HDL Cholesterol Calculated


 


 206 mg/dL


(0-129) 


 





 


HDL Cholesterol


 


 46 mg/dL


(40-60) 


 





 


Cholesterol/HDL Ratio  5.5   


 


Thyroid Stimulating Hormone


(TSH) 


 1.079 uIU/mL


(0.358-3.74) 


 





 


Urine Collection Type   Unknown  


 


Urine Color   Jesusita  


 


Urine Clarity   Clear  


 


Urine pH   6.0  


 


Urine Specific Gravity   >=1.030  


 


Urine Protein


 


 


 >=300 mg/dL


(NEG-TRACE) 





 


Urine Glucose (UA)


 


 


 Negative mg/dL


(NEG) 





 


Urine Ketones (Stick)


 


 


 Negative mg/dL


(NEG) 





 


Urine Blood   Trace (NEG)  


 


Urine Nitrite   Negative (NEG)  


 


Urine Bilirubin   Negative (NEG)  


 


Urine Urobilinogen Dipstick


 


 


 1.0 mg/dL (0.2


mg/dL) 





 


Urine Leukocyte Esterase   Negative (NEG)  


 


Urine RBC   Occ /HPF (0-2)  


 


Urine WBC   Occ /HPF (0-4)  


 


Urine Squamous Epithelial


Cells 


 


 Many /LPF 


 





 


Urine Bacteria


 


 


 Few /HPF


(0-FEW) 





 


Urine Hyaline Casts   Few /HPF  


 


Urine Mucus   Marked /LPF  


 


Test


 1/15/19


17:00 1/15/19


19:00 1/15/19


20:56 1/16/19


04:20


 


Glucose (Fingerstick)


 353 mg/dL


(70-99) 


 293 mg/dL


(70-99) 





 


Troponin I Quantitative


 


 0.068 ng/mL


(0.000-0.055) 


 





 


White Blood Count


 


 


 


 5.4 x10^3/uL


(4.0-11.0)


 


Red Blood Count


 


 


 


 5.43 x10^6/uL


(3.50-5.40)


 


Hemoglobin


 


 


 


 15.9 g/dL


(12.0-15.5)


 


Hematocrit


 


 


 


 46.9 %


(36.0-47.0)


 


Mean Corpuscular Volume    86 fL () 


 


Mean Corpuscular Hemoglobin    29 pg (25-35) 


 


Mean Corpuscular Hemoglobin


Concent 


 


 


 34 g/dL


(31-37)


 


Red Cell Distribution Width


 


 


 


 14.9 %


(11.5-14.5)


 


Platelet Count


 


 


 


 102 x10^3/uL


(140-400)


 


Neutrophils (%) (Auto)    80 % (31-73) 


 


Lymphocytes (%) (Auto)    14 % (24-48) 


 


Monocytes (%) (Auto)    6 % (0-9) 


 


Eosinophils (%) (Auto)    0 % (0-3) 


 


Basophils (%) (Auto)    0 % (0-3) 


 


Neutrophils # (Auto)


 


 


 


 4.3 x10^3uL


(1.8-7.7)


 


Lymphocytes # (Auto)


 


 


 


 0.8 x10^3/uL


(1.0-4.8)


 


Monocytes # (Auto)


 


 


 


 0.3 x10^3/uL


(0.0-1.1)


 


Eosinophils # (Auto)


 


 


 


 0.0 x10^3/uL


(0.0-0.7)


 


Basophils # (Auto)


 


 


 


 0.0 x10^3/uL


(0.0-0.2)


 


Sodium Level


 


 


 


 142 mmol/L


(136-145)


 


Potassium Level


 


 


 


 3.9 mmol/L


(3.5-5.1)


 


Chloride Level


 


 


 


 102 mmol/L


()


 


Carbon Dioxide Level


 


 


 


 29 mmol/L


(21-32)


 


Anion Gap    11 (6-14) 


 


Blood Urea Nitrogen


 


 


 


 35 mg/dL


(7-20)


 


Creatinine


 


 


 


 1.8 mg/dL


(0.6-1.0)


 


Estimated GFR


(Cockcroft-Gault) 


 


 


 27.6 





 


Glucose Level


 


 


 


 186 mg/dL


(70-99)


 


Calcium Level


 


 


 


 9.2 mg/dL


(8.5-10.1)


 


Test


 1/16/19


07:12 


 


 





 


Glucose (Fingerstick)


 163 mg/dL


(70-99) 


 


 











Laboratory Tests








Test


 1/15/19


10:05 1/15/19


10:51 1/15/19


14:20 1/15/19


16:00


 


Influenza Type A Antigen


 Negative


(NEGATIVE) 


 


 





 


Influenza Type B Antigen


 Negative


(NEGATIVE) 


 


 





 


White Blood Count


 


 7.4 x10^3/uL


(4.0-11.0) 


 





 


Red Blood Count


 


 5.75 x10^6/uL


(3.50-5.40) 


 





 


Hemoglobin


 


 16.6 g/dL


(12.0-15.5) 


 





 


Hematocrit


 


 49.7 %


(36.0-47.0) 


 





 


Mean Corpuscular Volume  86 fL ()   


 


Mean Corpuscular Hemoglobin  29 pg (25-35)   


 


Mean Corpuscular Hemoglobin


Concent 


 33 g/dL


(31-37) 


 





 


Red Cell Distribution Width


 


 14.8 %


(11.5-14.5) 


 





 


Platelet Count


 


 97 x10^3/uL


(140-400) 


 





 


Neutrophils (%) (Auto)  78 % (31-73)   


 


Lymphocytes (%) (Auto)  12 % (24-48)   


 


Monocytes (%) (Auto)  9 % (0-9)   


 


Eosinophils (%) (Auto)  1 % (0-3)   


 


Basophils (%) (Auto)  0 % (0-3)   


 


Neutrophils # (Auto)


 


 5.8 x10^3uL


(1.8-7.7) 


 





 


Lymphocytes # (Auto)


 


 0.8 x10^3/uL


(1.0-4.8) 


 





 


Monocytes # (Auto)


 


 0.7 x10^3/uL


(0.0-1.1) 


 





 


Eosinophils # (Auto)


 


 0.1 x10^3/uL


(0.0-0.7) 


 





 


Basophils # (Auto)


 


 0.0 x10^3/uL


(0.0-0.2) 


 





 


Sodium Level


 


 141 mmol/L


(136-145) 


 





 


Potassium Level


 


 3.9 mmol/L


(3.5-5.1) 


 





 


Chloride Level


 


 101 mmol/L


() 


 





 


Carbon Dioxide Level


 


 31 mmol/L


(21-32) 


 





 


Anion Gap  9 (6-14)   


 


Blood Urea Nitrogen


 


 20 mg/dL


(7-20) 


 





 


Creatinine


 


 1.3 mg/dL


(0.6-1.0) 


 





 


Estimated GFR


(Cockcroft-Gault) 


 40.2 


 


 





 


BUN/Creatinine Ratio  15 (6-20)   


 


Glucose Level


 


 167 mg/dL


(70-99) 


 





 


Lactic Acid Level


 


 1.7 mmol/L


(0.4-2.0) 


 





 


Calcium Level


 


 8.8 mg/dL


(8.5-10.1) 


 





 


Magnesium Level


 


 1.8 mg/dL


(1.8-2.4) 


 





 


Total Bilirubin


 


 1.4 mg/dL


(0.2-1.0) 


 





 


Aspartate Amino Transf


(AST/SGOT) 


 25 U/L (15-37) 


 


 





 


Alanine Aminotransferase


(ALT/SGPT) 


 26 U/L (14-59) 


 


 





 


Alkaline Phosphatase


 


 78 U/L


() 


 





 


Troponin I Quantitative


 


 0.085 ng/mL


(0.000-0.055) 


 0.063 ng/mL


(0.000-0.055)


 


NT-Pro-B-Type Natriuretic


Peptide 


 890 pg/mL


(0-124) 


 





 


Total Protein


 


 7.4 g/dL


(6.4-8.2) 


 





 


Albumin


 


 3.4 g/dL


(3.4-5.0) 


 





 


Albumin/Globulin Ratio  0.9 (1.0-1.7)   


 


Triglycerides Level


 


 144 mg/dL


(0-150) 


 





 


Cholesterol Level


 


 252 mg/dL


(0-200) 


 





 


LDL Cholesterol, Calculated


 


 177 mg/dL


(0-100) 


 





 


VLDL Cholesterol, Calculated


 


 29 mg/dL


(0-40) 


 





 


Non-HDL Cholesterol Calculated


 


 206 mg/dL


(0-129) 


 





 


HDL Cholesterol


 


 46 mg/dL


(40-60) 


 





 


Cholesterol/HDL Ratio  5.5   


 


Thyroid Stimulating Hormone


(TSH) 


 1.079 uIU/mL


(0.358-3.74) 


 





 


Urine Collection Type   Unknown  


 


Urine Color   Jesusita  


 


Urine Clarity   Clear  


 


Urine pH   6.0  


 


Urine Specific Gravity   >=1.030  


 


Urine Protein


 


 


 >=300 mg/dL


(NEG-TRACE) 





 


Urine Glucose (UA)


 


 


 Negative mg/dL


(NEG) 





 


Urine Ketones (Stick)


 


 


 Negative mg/dL


(NEG) 





 


Urine Blood   Trace (NEG)  


 


Urine Nitrite   Negative (NEG)  


 


Urine Bilirubin   Negative (NEG)  


 


Urine Urobilinogen Dipstick


 


 


 1.0 mg/dL (0.2


mg/dL) 





 


Urine Leukocyte Esterase   Negative (NEG)  


 


Urine RBC   Occ /HPF (0-2)  


 


Urine WBC   Occ /HPF (0-4)  


 


Urine Squamous Epithelial


Cells 


 


 Many /LPF 


 





 


Urine Bacteria


 


 


 Few /HPF


(0-FEW) 





 


Urine Hyaline Casts   Few /HPF  


 


Urine Mucus   Marked /LPF  


 


Test


 1/15/19


17:00 1/15/19


19:00 1/15/19


20:56 1/16/19


04:20


 


Glucose (Fingerstick)


 353 mg/dL


(70-99) 


 293 mg/dL


(70-99) 





 


Troponin I Quantitative


 


 0.068 ng/mL


(0.000-0.055) 


 





 


White Blood Count


 


 


 


 5.4 x10^3/uL


(4.0-11.0)


 


Red Blood Count


 


 


 


 5.43 x10^6/uL


(3.50-5.40)


 


Hemoglobin


 


 


 


 15.9 g/dL


(12.0-15.5)


 


Hematocrit


 


 


 


 46.9 %


(36.0-47.0)


 


Mean Corpuscular Volume    86 fL () 


 


Mean Corpuscular Hemoglobin    29 pg (25-35) 


 


Mean Corpuscular Hemoglobin


Concent 


 


 


 34 g/dL


(31-37)


 


Red Cell Distribution Width


 


 


 


 14.9 %


(11.5-14.5)


 


Platelet Count


 


 


 


 102 x10^3/uL


(140-400)


 


Neutrophils (%) (Auto)    80 % (31-73) 


 


Lymphocytes (%) (Auto)    14 % (24-48) 


 


Monocytes (%) (Auto)    6 % (0-9) 


 


Eosinophils (%) (Auto)    0 % (0-3) 


 


Basophils (%) (Auto)    0 % (0-3) 


 


Neutrophils # (Auto)


 


 


 


 4.3 x10^3uL


(1.8-7.7)


 


Lymphocytes # (Auto)


 


 


 


 0.8 x10^3/uL


(1.0-4.8)


 


Monocytes # (Auto)


 


 


 


 0.3 x10^3/uL


(0.0-1.1)


 


Eosinophils # (Auto)


 


 


 


 0.0 x10^3/uL


(0.0-0.7)


 


Basophils # (Auto)


 


 


 


 0.0 x10^3/uL


(0.0-0.2)


 


Sodium Level


 


 


 


 142 mmol/L


(136-145)


 


Potassium Level


 


 


 


 3.9 mmol/L


(3.5-5.1)


 


Chloride Level


 


 


 


 102 mmol/L


()


 


Carbon Dioxide Level


 


 


 


 29 mmol/L


(21-32)


 


Anion Gap    11 (6-14) 


 


Blood Urea Nitrogen


 


 


 


 35 mg/dL


(7-20)


 


Creatinine


 


 


 


 1.8 mg/dL


(0.6-1.0)


 


Estimated GFR


(Cockcroft-Gault) 


 


 


 27.6 





 


Glucose Level


 


 


 


 186 mg/dL


(70-99)


 


Calcium Level


 


 


 


 9.2 mg/dL


(8.5-10.1)


 


Test


 1/16/19


07:12 


 


 





 


Glucose (Fingerstick)


 163 mg/dL


(70-99) 


 


 











Medications





Active Scripts








 Medications  Dose


 Route/Sig


 Max Daily Dose Days Date Category Dose


Instructions


 


 Vitamin D


  (Cholecalciferol


  (Vitamin D3))


 50,000 Unit


 Capsule  50,000 Unit


 PO WEEKLY


  30 1/15/19 Rx 


 


 Spiriva


  (Tiotropium


 Bromide) 18 Mcg


 Cap.w.dev  1 Cap


 IH DAILY


   1/15/19 Rx 


 


 Claritin


  (Loratadine) 10


 Mg Tablet  1 Tab


 PO DAILY


   1/15/19 Rx 


 


 Losartan


 Potassium 50 Mg


 Tablet  50 Mg


 PO DAILY


  30 1/15/19 Rx 


 


 Lasix


  (Furosemide) 20


 Mg Tablet  1 Tab


 PO DAILY


   1/15/19 Rx 


 


 Atorvastatin


 Calcium 40 Mg


 Tablet  1 Tab


 PO DAILY


   1/15/19 Rx 


 


 Vitamin D2


  (Ergocalciferol


  (Vitamin D2))


 50,000 Unit


 Capsule  1 Cap


 PO WEEKLY


   1/15/19 Reported 


 


 Proair Hfa


  (Albuterol


 Sulfate) 8.5 Gm


 Hfa.aer.ad  1 Puff


 INH QID PRN


   1/15/19 Reported 


 


 Januvia


  (Sitagliptin


 Phosphate) 50 Mg


 Tablet  1 Tab


 PO DAILY


   1/15/19 Reported 


 


 Metoprolol


 Tartrate 25 Mg


 Tablet  25 Mg


 PO BID


   2/28/16 Reported 


 


 Aspir 81


  (Aspirin) 81 Mg


 Tablet.dr  1 Tab


 PO DAILY


   2/28/16 Reported 


 


 Antivert


  (Meclizine Hcl)


 12.5 Mg Tablet  25 Mg


 PO PRN TID PRN


   2/28/16 Rx 


 


 Glipizide Er


  (Glipizide) 2.5


 Mg Tab.er.24  4 Tab


 PO DAILY


   2/26/16 Reported  Gave this morning


 Take again tomorrow morning


 


 Gabapentin **


  (Gabapentin) 100


 Mg Capsule  100 Mg


 PO PRN Q8HRS PRN


   2/26/16 Reported  Not given on this admission


 Take as previously directed


 


 Metformin Hcl Er


  (Metformin Hcl)


 500 Mg Tab.er.24h  2 Tab


 PO BID


   2/26/16 Reported  Gave this morning


 Take again tonight











Impression


.





IMPRESSION:


1.  Acute on chronic hypoxemic respiratory failure.


2.  Acute nonspecific bronchitis.


3.  Recent RSV exposure.


4.  Significant wheezing, dyspnea secondary to above.





Plan


.


HOME OK BY


SPOKE WITH FAMILY











GELY GROVER MD Jan 16, 2019 08:48

## 2019-01-16 NOTE — NUR
Discharge Note:



KETAN JETT



Discharge instructions and discharge home medications reviewed with Patient and a copy 
given. All questions have been answered and understanding verbalized. 



The following instructions and handouts were given: COPD, COPD exacerbation, Troponin



Discontinued lines and drains: 20G R-hand removed. Catheter intact. 



Patient discharged to home with self-care via  to private vehicle. Pt. son accepted 
discharge instuctions. Pt. dressed self and had a steady gait walking to  and from  to 
car.

## 2019-01-16 NOTE — CARD
MR#: [id number]

Account#: [id number]

Accession#: [id number]

Date of Study: 01/16/2019

Ordering Physician: ZAC RANGEL, 

Referring Physician: LEÓN AMBROCIO 

Tech: Tory Mireles RDCS





--------------- APPROVED REPORT --------------





EXAM: Two-dimensional and M-mode echocardiogram with Doppler and color Doppler.



Other Information 

Quality : GoodHR: 63bpm

Rhythm : NSR



INDICATION

CAD 



2D DIMENSIONS 

RVDd2.7 (2.9-3.5cm)Left Atrium(2D)3.9 (1.6-4.0cm)

IVSd1.3 (0.7-1.1cm)Aortic Root(2D)3.1 (2.0-3.7cm)

LVDd4.6 (3.9-5.9cm)LVOT Diameter2.3 (1.8-2.4cm)

PWd0.9 (0.7-1.1cm)LVDs2.4 (2.5-4.0cm)

FS (%) 47.8 %SV77.4 ml

LVEF(%)79.3 (>50%)



M-Mode DIMENSIONS 

Left Atrium(MM)3.90 (2.5-4.0cm)Aortic Root3.23 (2.2-3.7cm)



Aortic Valve

AoV Peak Brian.149.9cm/sAoV VTI32.3cm

AO Peak GR.9.0mmHgLVOT Peak Brian.100.3cm/s

AO Mean GR.5mmHgAVA (VMAX)2.67cm2

[place]   (VTI)2.83ij9WJ P 1/2 [id number]



Mitral Valve

MV E Dpbpjsmx113.0cm/sMV E Peak Gr.8mmHg

MV DECEL [id number] A Djgpvtjn84.8cm/s

MV E Mean Gr.2mmHgE/A  Ratio2.1

MV A [id number]



Pulmonary Valve

PV Peak Dgeusdkp717.2cm/s



Tricuspid Valve

TR P. Iugzeavl396bo/sRAP [id number]

TR Peak Gr.[id number]



Pulmonary Vein

S1 Nyviyjru56.3cm/sD2 Nvmkhozm87.3cm/s



 LEFT VENTRICLE 

The left ventricle is normal size. Proximal septal thickening is noted. The left ventricle systolic f
unction is hyperdynamic. The Ejection Fraction is >70%. There is normal LV segmental wall motion. Tra
nsmitral Doppler flow pattern is Grade II-pseudonormal filling dynamics.



 RIGHT VENTRICLE 

The right ventricle is normal size. There is normal right ventricular wall thickness. The right ventr
icular systolic function is normal.



 ATRIA 

The left atrium size is normal. The right atrium size is normal. The interatrial septum is intact wit
h no evidence for an atrial septal defect or patent foramen ovale as noted on 2-D or Doppler imaging.




 AORTIC VALVE 

The aortic valve is calcified but opens well. The aortic valve is trileaflet. Doppler and Color Flow 
revealed mild aortic regurgitation. There is no significant aortic valvular stenosis.



 MITRAL VALVE 

There is systolic anterior motion of the mitral valve. There is no evidence of mitral valve prolapse.
 There is no mitral valve stenosis. Doppler and Color-flow revealed mild mitral regurgitation.



 TRICUSPID VALVE 

The tricuspid valve is normal in structure and function. Doppler and Color Flow revealed trace tricus
pid regurgitation. There is moderate pulmonary hypertension. The PA pressure was estimated at 58 mmHg
. There is no tricuspid valve prolapse or vegetation. There is no tricuspid valve stenosis.



 PULMONIC VALVE 

The pulmonary valve is normal in structure and function. Doppler and Color Flow revealed mild pulmoni
c valvular regurgitation. There is no pulmonic valvular stenosis.



 GREAT VESSELS 

The aortic root is normal in size. The ascending aorta is normal in size. The IVC is normal in size a
nd collapses >50% with inspiration.



 PERICARDIAL EFFUSION 

There is no evidence of significant pericardial effusion.



Critical Notification

Critical Value: No



<Conclusion>

The left ventricle systolic function is hyperdynamic.

The Ejection Fraction is >70%.

There is normal LV segmental wall motion.

Transmitral Doppler flow pattern is Grade II-pseudonormal filling dynamics.

Mild aortic regurgitation.

Mild mitral regurgitation.

Trace tricuspid regurgitation.

There is moderate pulmonary hypertension.

The PA pressure was estimated at 58 mmHg.

There is no evidence of significant pericardial effusion.



Signed by : Donovan Lira, 

Electronically Approved : 01/16/2019 11:00:37

## 2019-11-04 NOTE — RAD
PORTABLE CHEST 1V

 

History: Right-sided chest pain.

 

Comparison: January 15, 2019

 

Findings:

Cardiomegaly, unchanged. Prior median sternotomy. No consolidation or 

pleural effusion. No pneumothorax.

 

Impression: 

1.  No acute cardiopulmonary process.

2.  Cardiomegaly, unchanged.

 

Electronically signed by: Johnathan Chapman DO (11/4/2019 5:18 PM) [id number]

## 2019-11-04 NOTE — PHYS DOC
Past Medical History


Past Medical History:  COPD, Diabetes-Type II, High Cholesterol, Hypertension


Past Surgical History:  Coronary Bypass Surgery, Other


Additional Past Surgical Histo:  Cardiac Stents - UNKNOWN NUMBER


Alcohol Use:  Rarely


Drug Use:  None





Adult General


Chief Complaint


Chief Complaint:  CHEST PAIN





HPI


HPI





Patient is a 74  year old Female who presents with 3 days of right-sided chest 

pain it's a burning sensation. Patient states when she gets up and moves is 

worse. Patient states it does not radiate. Patient states she also has some 

shortness of air. Patient also states that she has dizziness and blurred vision 

especially when she stands up but not at this time. She states it comes and 

goes. She states that dizziness and the blurred vision has been going on for 

months. Patient does have history of vertigo. Patient rates her pain a 10 out of

10. Patient denies abdominal pain, nausea, vomiting, diarrhea, syncope, 

headache.





Review of Systems


Review of Systems








Eyes: Blurred visual acuity, denies redness, or eye pain []


HENT: Denies nasal congestion or sore throat []


Respiratory: Denies cough. +shortness of breath []


Cardiovascular: Right sided burning chest pain


Neurologic: Dizziness. Denies headache, focal weakness or sensory changes []








All other systems were reviewed and found to be within normal limits, except as 

documented in this note.





Current Medications


Current Medications





Current Medications








 Medications


  (Trade)  Dose


 Ordered  Sig/Kenroy  Start Time


 Stop Time Status Last Admin


Dose Admin


 


 Aspirin


  (Jovanny Aspirin)  325 mg  1X  ONCE  19 16:45


 19 16:46 DC 19 17:56


325 MG


 


 Clonidine HCl


  (Catapres)  0.1 mg  1X  ONCE  19 17:00


 19 17:54 DC  





 


 Famotidine


  (Pepcid Vial)  20 mg  1X  ONCE  19 16:45


 19 16:46 DC 19 17:57


20 MG











Allergies


Allergies





Allergies








Coded Allergies Type Severity Reaction Last Updated Verified


 


  No Known Drug Allergies    16 No











Physical Exam


Physical Exam





Constitutional: Well developed, well nourished, no acute distress, non-toxic 

appearance. []


HENT: Normocephalic, atraumatic, bilateral external ears normal, oropharynx 

moist, no oral exudates, nose normal. []


Eyes: PERRLA, EOMI, conjunctiva normal, no discharge. [] 


Neck: Normal range of motion, no tenderness, supple, no stridor. [] 


Cardiovascular:Heart rate regular rhythm, no murmur. Tenderness with palpation 

to right chest.  []


Lungs & Thorax:  Bilateral breath sounds clear to auscultation []


Abdomen: Bowel sounds normal, soft, no tenderness, no masses, no pulsatile 

masses. [] 


Skin: Warm, dry, no erythema, no rash. [] 


Back: No tenderness, no CVA tenderness. [] 


Extremities: No tenderness, no cyanosis, no clubbing, ROM intact, no edema. [] 


Neurologic: Alert and oriented X 3, normal motor function, normal sensory 

function, no focal deficits noted. []


Psychologic: Affect normal, judgement normal, mood normal. []





Current Patient Data


Vital Signs





                                   Vital Signs








  Date Time  Temp Pulse Resp B/P (MAP) Pulse Ox O2 Delivery O2 Flow Rate FiO2


 


19 16:21 97.3 58 16 222/88 (132) 96 Room Air  





 97.3       








Lab Values





                                Laboratory Tests








Test


 19


16:50 19


17:35


 


White Blood Count


 7.6 x10^3/uL


(4.0-11.0) 





 


Red Blood Count


 5.28 x10^6/uL


(3.50-5.40) 





 


Hemoglobin


 15.2 g/dL


(12.0-15.5) 





 


Hematocrit


 45.2 %


(36.0-47.0) 





 


Mean Corpuscular Volume


 86 fL ()


 





 


Mean Corpuscular Hemoglobin 29 pg (25-35)   


 


Mean Corpuscular Hemoglobin


Concent 34 g/dL


(31-37) 





 


Red Cell Distribution Width


 15.1 %


(11.5-14.5)  H 





 


Platelet Count


 118 x10^3/uL


(140-400)  L 





 


Neutrophils (%) (Auto) 58 % (31-73)   


 


Lymphocytes (%) (Auto) 30 % (24-48)   


 


Monocytes (%) (Auto) 8 % (0-9)   


 


Eosinophils (%) (Auto) 3 % (0-3)   


 


Basophils (%) (Auto) 1 % (0-3)   


 


Neutrophils # (Auto)


 4.4 x10^3/uL


(1.8-7.7) 





 


Lymphocytes # (Auto)


 2.3 x10^3/uL


(1.0-4.8) 





 


Monocytes # (Auto)


 0.6 x10^3/uL


(0.0-1.1) 





 


Eosinophils # (Auto)


 0.2 x10^3/uL


(0.0-0.7) 





 


Basophils # (Auto)


 0.1 x10^3/uL


(0.0-0.2) 





 


Sodium Level


 


 147 mmol/L


(136-145)  H


 


Potassium Level


 


 4.1 mmol/L


(3.5-5.1)


 


Chloride Level


 


 106 mmol/L


()


 


Carbon Dioxide Level


 


 29 mmol/L


(21-32)


 


Anion Gap  12 (6-14)  


 


Blood Urea Nitrogen


 


 27 mg/dL


(7-20)  H


 


Creatinine


 


 1.4 mg/dL


(0.6-1.0)  H


 


Estimated GFR


(Cockcroft-Gault) 


 36.8  





 


BUN/Creatinine Ratio  19 (6-20)  


 


Glucose Level


 


 156 mg/dL


(70-99)  H


 


Calcium Level


 


 9.3 mg/dL


(8.5-10.1)


 


Total Bilirubin


 


 1.1 mg/dL


(0.2-1.0)  H


 


Aspartate Amino Transferase


(AST) 


 23 U/L (15-37)





 


Alanine Aminotransferase (ALT)


 


 24 U/L (14-59)





 


Alkaline Phosphatase


 


 69 U/L


()


 


Troponin I Quantitative


 


 0.025 ng/mL


(0.000-0.055)


 


Total Protein


 


 7.4 g/dL


(6.4-8.2)


 


Albumin


 


 3.6 g/dL


(3.4-5.0)


 


Albumin/Globulin Ratio


 


 0.9 (1.0-1.7)


L





                                Laboratory Tests


19 16:50








                                Laboratory Tests


19 17:35











EKG


EKG


Sinus Rhythm and no STEMI[]


Interpretation Time:


1627 and read by Dr Goldsmith





Radiology/Procedures


Radiology/Procedures


[]


Impressions:


[facility]


                    [address]  [place], [place] [id number] [phone]


                                        


                                 IMAGING REPORT





                                     Signed





PATIENT: GINA JETT         ACCOUNT: [id number]     MRN#: [id number]


: 1945           LOCATION: ER              AGE: 74


SEX: F                    EXAM DT: 19         ACCESSION#: [id number]


STATUS: REG ER            ORD. PHYSICIAN: BAFUS,ELZBIETA M APRN


REASON: DIZZINESS


PROCEDURE: CT HEAD WO CONTRAST





CT HEAD WO CONTRAST


 


History: Dizziness


 


Comparison: Brain MRI 2016 and CT 2016


 


Technique: Noncontrast CT imaging was performed of the head.  


 


Exposure: One or more of the following individualized dose reduction 


techniques were utilized for this examination:  


1. Automated exposure control  


2. Adjustment of the mA and/or kV according to patient size  


3. Use of iterative reconstruction technique.


 


Findings: 


No acute intracranial hemorrhage. No hydrocephalus.


 


 Right paramedian frontal region partially calcified extra-axial mass 


measures 2.9 x 2.2 cm. Additional smaller calcified left superior frontal 


parietal extra-axial lesion, unchanged, favor meningioma.


 


Foci of decreased attenuation within the hemispheric white matter, most 


often due to moderate sequela chronic microvascular ischemia. Mild brain 


parenchymal volume loss., similar compared to prior.


 


Imaged orbits are unremarkable. Minimal scattered paranasal sinus mucosal 


thickening. Mastoid air cells are clear. No acute calvarial fracture.


 


Left frontal scalp cutaneous lesion, increased compared to prior measures 


0.7 cm compared to 0.4 cm.


 


Impression:


1.  No acute intracranial abnormality. 


2.  Unchanged right paramedian frontal extra-axial partially calcified 


mass, favor meningioma.


3.  Moderate sequela chronic microvascular ischemia.


 


Electronically signed by: Johnathan Chapman DO (2019 5:30 PM) [id number]














DICTATED and SIGNED BY:     JOHNATHAN CHAPMAN DO


DATE:     19 173





                            [facility]


                    [address] [place] [address]


                                 [phone]


                                        


                                 IMAGING REPORT





                                     Signed





PATIENT: GINA JETT         ACCOUNT: [id number]     MRN#: [id number]


: 1945           LOCATION: ER              AGE: 74


SEX: F                    EXAM DT: 19         ACCESSION#: [id number]


STATUS: REG ER            ORD. PHYSICIAN: ELZBIETA VELIZ APRN


REASON: RIGHT SIDED CHEST PAIN X3 DAYS


PROCEDURE: PORTABLE CHEST 1V





PORTABLE CHEST 1V


 


History: Right-sided chest pain.


 


Comparison: January 15, 2019


 


Findings:


Cardiomegaly, unchanged. Prior median sternotomy. No consolidation or 


pleural effusion. No pneumothorax.


 


Impression: 


1.  No acute cardiopulmonary process.


2.  Cardiomegaly, unchanged.


 


Electronically signed by: Johnathan Chapman DO (2019 5:18 PM) [id number]














DICTATED and SIGNED BY:     JOHNATHAN CHAPMAN DO


DATE:     19








Course & Med Decision Making


Course & Med Decision Making


Patient states she took her hypertension medication today but her blood pressure

 is 222/88. She is alert and oriented. Speaks in full clear sentences. 

Ambulatory with a steady gait. Moves all extremities equally and has equal 

strengths. Denies any numbness or tingling. Patient does have some neuropathy in

 her hands and feet due to diabetes. Lungs are clear to auscultation in all 

lobes. Skin is pink warm and dry. Patient is eating and drinking without 

location. Abdomen is soft and nontender. When I palpate the chest patient states

 it does hurt worse right shoulder her chest on the right side. PERRLA. Patient 

has a history of crack cholesterol, hypertension, diabetes, COPD, neuropathy, 

vertigo. No extremity edema. She is sinus rhythm and no STEMI.





: Blood pressure is 158/ 64. The nurse had not given the Clonidine yet and I

 have told RN to hold the Clonidine as the blood pressure has come down on its 

own. 





: Has spoken to Dr. brothers for admission.





Dragon Disclaimer


Dragon Disclaimer


This electronic medical record was generated, in whole or in part, using a voice

 recognition dictation system.





The HEART Score for CP Pts


HEART Score for Chest Pain:  








HEART Score for Chest Pain Response (Comments) Value


 


History Moderately Suspicious 1


 


ECG Nonspecific Repolarizatio 1


 


Age > 65 2


 


Risk Factors >3 Risk Factors or Hx CAD 2


 


Troponin < Normal Limit 0


 


Total  6








Risk Factors:


Risk Factors:  DM, Current or recent (<one month) smoker, HTN, HLP, family 

history of CAD, obesity.


Risk Scores:


Score 0 - 3:  2.5% MACE over next 6 weeks - Discharge Home


Score 4 - 6:  20.3% MACE over next 6 weeks - Admit for Clinical Observation


Score 7 - 10:  72.7% MACE over next 6 weeks - Early Invasive Strategies





NIHSS Stroke Scale


NIH Stroke Scale:  








NIH Stroke Scale Response (Comments) Value


 


Level of Consciousness:                 0 Alert/Responsive 0


 


LOC Questions:                          0 Answers both correctly 0


 


LOC Commands:                           0 Performs both tasks 0


 


Best Gaze:                              0 Normal 0


 


Visual:                                 0 No visual loss 0


 


Facial Palsy:                           0 Normal, symmetrical 0


 


Motor - Left Arm                        0 No drift 0


 


Motor - Right Arm                       0 No drift 0


 


Motor - Left Leg                        0 No drift 0


 


Motor: Right Leg                        0 No drift 0


 


Limb Ataxia:                            0 Absent 0


 


Sensory:                                0 No loss 0


 


Best Language:                          0 Normal 0


 


Dysathria:                              0 Normal 0


 


Extinction and Inattention:             0 Normal 0


 


Total  0











Departure


Departure


Impression:  


   Primary Impression:  


   Chest pain


Disposition:  09 ADMITTED AS INPATIENT


Admitting Physician:  HIMS


Condition:  STABLE


Referrals:  


LEÓN AMBROCIO MD (PCP)





Problem Qualifiers








   Primary Impression:  


   Chest pain


   Chest pain type:  unspecified  Qualified Codes:  R07.9 - Chest pain, 

   unspecified








ELZBIETA VELIZ APRN             2019 16:41

## 2019-11-04 NOTE — RAD
CT HEAD WO CONTRAST

 

History: Dizziness

 

Comparison: Brain MRI February 27, 2016 and CT February 26, 2016

 

Technique: Noncontrast CT imaging was performed of the head.  

 

Exposure: One or more of the following individualized dose reduction 

techniques were utilized for this examination:  

1. Automated exposure control  

2. Adjustment of the mA and/or kV according to patient size  

3. Use of iterative reconstruction technique.

 

Findings: 

No acute intracranial hemorrhage. No hydrocephalus.

 

 Right paramedian frontal region partially calcified extra-axial mass 

measures 2.9 x 2.2 cm. Additional smaller calcified left superior frontal 

parietal extra-axial lesion, unchanged, favor meningioma.

 

Foci of decreased attenuation within the hemispheric white matter, most 

often due to moderate sequela chronic microvascular ischemia. Mild brain 

parenchymal volume loss., similar compared to prior.

 

Imaged orbits are unremarkable. Minimal scattered paranasal sinus mucosal 

thickening. Mastoid air cells are clear. No acute calvarial fracture.

 

Left frontal scalp cutaneous lesion, increased compared to prior measures 

0.7 cm compared to 0.4 cm.

 

Impression:

1.  No acute intracranial abnormality. 

2.  Unchanged right paramedian frontal extra-axial partially calcified 

mass, favor meningioma.

3.  Moderate sequela chronic microvascular ischemia.

 

Electronically signed by: Johnathan Chapman DO (11/4/2019 5:30 PM) [email or web address]

## 2019-11-04 NOTE — PDOC1
History and Physical


Date of Admission


Date of Admission


DATE: 11/4/19 


TIME: 20:26





Source


Source:  Chart review, Patient





History of Present Illness


History of Present Illness


  Ms. Caballero is a 74  year old Female admit [facility] intermittent chest pain,   

currently improved,  she has had  3 days of right-sided chest pain it's a 

burning sensation.    about a week ago, she had upper back pain that her family 

tried to treat with massage and cupping that hasnt much improved her symptoms.  

Patient states when she gets up and moves is worse. Patient states it does not 

radiate. 


She was in the ER earlier in an uncomfortable chair for a long time as her 

 was being admitted,   Also has vertigo and complains of  dizziness and 

blurred vision especially when she stands up but not at this time.


 painwas 10/10 in ER,but now resolved.


I admitted her  about 2 hours ago for abd pain





Past Medical History


Cardiovascular:  CAD, HTN, Hyperlipidemia, Other


CENTRAL NERVOUS SYSTEM:  Periperal neuropathy, Vertigo, Other


GI:  No pertinent hx


Musculoskeletal:  Osteoarthritis


Infectious disease:  No pertinent hx


Renal/:  Chronic renal insuff


Endocrine:  Diabetes





Past Surgical History


Past Surgical History:  CABG, Other





Family History


Family History:  No Significant





Social History


Smoke:  Quit (many yrs ago)


ALCOHOL:  none


Drugs:  None





Current Problem List


Problem List


Problems


Medical Problems:


(1) Chest pain


Status: Acute  











Current Medications


Current Medications





Current Medications


Aspirin (Jovanny Aspirin) 325 mg 1X  ONCE PO  Last administered on 11/4/19at 

17:56;  Start 11/4/19 at 16:45;  Stop 11/4/19 at 16:46;  Status DC


Famotidine (Pepcid Vial) 20 mg 1X  ONCE IVP  Last administered on 11/4/19at 

17:57;  Start 11/4/19 at 16:45;  Stop 11/4/19 at 16:46;  Status DC


Clonidine HCl (Catapres) 0.1 mg 1X  ONCE PO ;  Start 11/4/19 at 17:00;  Stop 

11/4/19 at 17:54;  Status DC


Ondansetron HCl (Zofran) 4 mg PRN Q8HRS  PRN IV NAUSEA/VOMITING;  Start 11/4/19 

at 18:45;  Stop 11/5/19 at 18:44


Fentanyl Citrate (Fentanyl 2ml Vial) 50 mcg PRN Q1HR  PRN IV PAIN;  Start 

11/4/19 at 18:45;  Stop 11/5/19 at 18:44


Amlodipine Besylate (Norvasc) 5 mg 1X  ONCE PO ;  Start 11/4/19 at 20:15;  Stop 

11/4/19 at 20:16


Amlodipine Besylate (Norvasc) 5 mg DAILY PO ;  Start 11/5/19 at 09:00


Albuterol Sulfate (Ventolin Neb Soln) 2.5 mg QID  PRN INH SHORTNESS OF BREATH;  

Start 11/4/19 at 20:15;  Status UNV


Aspirin (Ecotrin) 81 mg DAILY PO ;  Start 11/5/19 at 09:00;  Status UNV


Atorvastatin Calcium (Lipitor) 40 mg DAILY PO ;  Start 11/5/19 at 09:00;  Status

UNV


Benzonatate (Tessalon Perle) 100 mg TID PO ;  Start 11/4/19 at 21:00;  Status 

UNV


Gabapentin (Neurontin) 100 mg PRN Q8HRS  PRN PO PAIN;  Start 11/4/19 at 20:15;  

Status UNV


Losartan Potassium (Cozaar) 50 mg DAILY PO ;  Start 11/5/19 at 09:00;  Status 

UNV


Metoprolol Tartrate (Lopressor) 50 mg BID PO ;  Start 11/4/19 at 21:00;  Status 

UNV


Prednisone (Prednisone) 10 mg DAILY PO ;  Start 11/5/19 at 09:00;  Status UNV


Non-Formulary Medication (Loratadine (Claritin)) 1 tab DAILY PO ;  Start 11/5/19

at 09:00;  Status UNV


Non-Formulary Medication (Sitagliptin Phosphate (Januvia)) 1 tab DAILY PO ;  

Start 11/5/19 at 09:00;  Status UNV


Non-Formulary Medication (Tiotropium Bromide (Spiriva)) 1 cap DAILY IH ;  Start 

11/5/19 at 09:00;  Status UNV


Metoprolol Tartrate (Lopressor Vial) 5 mg PRN Q6HRS  PRN IVP HYPERTENSION;  

Start 11/4/19 at 20:15;  Status UNV





Active Scripts


Active


Prednisone ** (Prednisone) 10 Mg Tablet 10 Mg PO UD


     Take 5 tablets by mouth daily for 2 days, then


     take 4 tablets by mouth daily for 2 days, then


     take 3 tablets by mouth daily for 2 days, then


     take 2 tablets by mouth daily for 2 days, then


     take 1 tablets by mouth daily for 2 days, then stop.


Tessalon Perle (Benzonatate) 100 Mg Capsule 1 Cap PO TID


Spiriva (Tiotropium [place]) 18 Mcg Cap.w.dev 1 Cap IH DAILY


Claritin (Loratadine) 10 Mg Tablet 1 Tab PO DAILY


Losartan Potassium 50 Mg Tablet 50 Mg PO DAILY 30 Days


Atorvastatin Calcium 40 Mg Tablet 1 Tab PO DAILY


Reported


Proair Hfa (Albuterol Sulfate) 8.5 Gm Hfa.aer.ad 1 Puff INH QID PRN


Januvia (Sitagliptin Phosphate) 50 Mg Tablet 1 Tab PO DAILY


Metoprolol Tartrate 25 Mg Tablet 25 Mg PO BID


Aspir 81 (Aspirin) 81 Mg Tablet.dr 1 Tab PO DAILY


Glipizide Er (Glipizide) 2.5 Mg Tab.er.24 4 Tab PO DAILY


     Gave this morning


     Take again tomorrow morning


Gabapentin ** (Gabapentin) 100 Mg Capsule 100 Mg PO PRN Q8HRS PRN


     Not given on this admission


     Take as previously directed


Metformin Hcl Er (Metformin Hcl) 500 Mg Tab.er.24h 2 Tab PO BID


     Gave this morning


     Take again tonight





Allergies


Allergies:  


Coded Allergies:  


     No Known Drug Allergies (Unverified , 2/26/16)





ROS


Review of System





 Patient denies abdominal pain, nausea, vomiting, diarrhea, syncope, headache.


General:  No: Chills, Night Sweats, Fatigue, Malaise, Appetite, Other


PSYCHOLOGICAL ROS:  No: Anxiety, Behavioral Disorder, Concentration difficultie,

Decreased libido, Depression, Disorientation, Hallucinations, Hostility, Irri

tablity, Memory difficulties, Mood Swings, Obsessive thoughts, Physical abuse, 

Sexual abuse, Sleep disturbances, Suicidal ideation, Other


Eyes:  No Blurry vision, No Decreased vision, No Double vision, No Dry eyes, No 

Excessive tearing, No Eye Pain, No Itchy Eyes, No Loss of vision, No 

Photophobia, No Scotomata, No Uses contacts, No Uses glasses, No Other


HEENT:  No: Heacaches, Visual Changes, Hearing change, Nasal congestion, Nasal 

discharge, Oral lesions, Sinus pain, Sore Throat, Epistaxis, Sneezing, Snoring, 

Tinnitus, Vertigo, Vocal changes, Other


Respiratory:  No: Cough, Hemoptysis, Orthopnea, Pleuritic Pain, Shortness of 

breath, SOB with excertion, Sputum Changes, Stridor, Tachypnea, Wheezing, Other


Cardiovascular:  No Chest Pain, No Palpitations, No Orthopnea, No Paroxysmal 

Noc. Dyspnea, No Edema, No Lt Headedness, No Other


Gastrointestinal:  No Nausea, No Vomiting, No Abdominal Pain, No Diarrhea, No 

Constipation, No Melena, No Hematochezia, No Other


Genitourinary:  No Dysuria, No Frequency, No Incontinence, No Hematuria, No 

Retention, No Discharge, No Urgency, No Pain, No Flank Pain, No Other, No , No ,

No , No , No , No , No 


Musculoskeletal:  Yes Joint Stiffness, Yes Pain In: (back 1 week); 


   No Gait Disturbance, No Joint Pain, No Joint Swelling, No Muscle Pain, No 

Muscular Weakness, No Swelling In:, No Other


Neurological:  No Behavorial Changes, No Bowel/Bladder ControlChng, No 

Confusion, No Dizziness, No Gait Disturbance, No Headaches, No Impaired 

Coord/balance, No Memory Loss, No Numbness/Tingling, No Seizures, No Speech 

Problems, No Tremors, No Visual Changes, No Weakness, No Other


Skin:  Yes Dry Skin; 


   No Eczema, No Hair Changes, No Lumps, No Mole Changes, No Mottling, No Nail 

Changes, No Pruritus, No Rash, No Skin Lesion Changes, No Other, No Acne





Physical Exam


General:  Alert, Oriented X3, Cooperative, No acute distress


HEENT:  Atraumatic, PERRLA, EOMI, Mucous membr. moist/pink


Lungs:  Clear to auscultation, Normal air movement


Heart:  S1S2, RRR, no thrills, no gallops


Abdomen:  Normal bowel sounds, Soft


Rectal Exam:  not examined


Extremities:  No clubbing, No edema


Skin:  No rashes, No significant lesion


Neuro:  Normal gait, Normal speech, Normal tone


Psych/Mental Status:  Mental status NL, Mood NL





Vitals


Vitals





Vital Signs








  Date Time  Temp Pulse Resp B/P (MAP) Pulse Ox O2 Delivery O2 Flow Rate FiO2


 


11/4/19 18:59  58 17 194/83 (120) 93 Room Air  


 


11/4/19 16:21 97.3       





 97.3       











Labs


Labs





Laboratory Tests








Test


 11/4/19


16:50 11/4/19


17:35


 


White Blood Count


 7.6 x10^3/uL


(4.0-11.0) 





 


Red Blood Count


 5.28 x10^6/uL


(3.50-5.40) 





 


Hemoglobin


 15.2 g/dL


(12.0-15.5) 





 


Hematocrit


 45.2 %


(36.0-47.0) 





 


Mean Corpuscular Volume 86 fL ()  


 


Mean Corpuscular Hemoglobin 29 pg (25-35)  


 


Mean Corpuscular Hemoglobin


Concent 34 g/dL


(31-37) 





 


Red Cell Distribution Width


 15.1 %


(11.5-14.5) 





 


Platelet Count


 118 x10^3/uL


(140-400) 





 


Neutrophils (%) (Auto) 58 % (31-73)  


 


Lymphocytes (%) (Auto) 30 % (24-48)  


 


Monocytes (%) (Auto) 8 % (0-9)  


 


Eosinophils (%) (Auto) 3 % (0-3)  


 


Basophils (%) (Auto) 1 % (0-3)  


 


Neutrophils # (Auto)


 4.4 x10^3/uL


(1.8-7.7) 





 


Lymphocytes # (Auto)


 2.3 x10^3/uL


(1.0-4.8) 





 


Monocytes # (Auto)


 0.6 x10^3/uL


(0.0-1.1) 





 


Eosinophils # (Auto)


 0.2 x10^3/uL


(0.0-0.7) 





 


Basophils # (Auto)


 0.1 x10^3/uL


(0.0-0.2) 





 


Sodium Level


 


 147 mmol/L


(136-145)


 


Potassium Level


 


 4.1 mmol/L


(3.5-5.1)


 


Chloride Level


 


 106 mmol/L


()


 


Carbon Dioxide Level


 


 29 mmol/L


(21-32)


 


Anion Gap  12 (6-14) 


 


Blood Urea Nitrogen


 


 27 mg/dL


(7-20)


 


Creatinine


 


 1.4 mg/dL


(0.6-1.0)


 


Estimated GFR


(Cockcroft-Gault) 


 36.8 





 


BUN/Creatinine Ratio  19 (6-20) 


 


Glucose Level


 


 156 mg/dL


(70-99)


 


Calcium Level


 


 9.3 mg/dL


(8.5-10.1)


 


Total Bilirubin


 


 1.1 mg/dL


(0.2-1.0)


 


Aspartate Amino Transf


(AST/SGOT) 


 23 U/L (15-37) 





 


Alanine Aminotransferase


(ALT/SGPT) 


 24 U/L (14-59) 





 


Alkaline Phosphatase


 


 69 U/L


()


 


Troponin I Quantitative


 


 0.025 ng/mL


(0.000-0.055)


 


Total Protein


 


 7.4 g/dL


(6.4-8.2)


 


Albumin


 


 3.6 g/dL


(3.4-5.0)


 


Albumin/Globulin Ratio  0.9 (1.0-1.7) 








Laboratory Tests








Test


 11/4/19


16:50 11/4/19


17:35


 


White Blood Count


 7.6 x10^3/uL


(4.0-11.0) 





 


Red Blood Count


 5.28 x10^6/uL


(3.50-5.40) 





 


Hemoglobin


 15.2 g/dL


(12.0-15.5) 





 


Hematocrit


 45.2 %


(36.0-47.0) 





 


Mean Corpuscular Volume 86 fL ()  


 


Mean Corpuscular Hemoglobin 29 pg (25-35)  


 


Mean Corpuscular Hemoglobin


Concent 34 g/dL


(31-37) 





 


Red Cell Distribution Width


 15.1 %


(11.5-14.5) 





 


Platelet Count


 118 x10^3/uL


(140-400) 





 


Neutrophils (%) (Auto) 58 % (31-73)  


 


Lymphocytes (%) (Auto) 30 % (24-48)  


 


Monocytes (%) (Auto) 8 % (0-9)  


 


Eosinophils (%) (Auto) 3 % (0-3)  


 


Basophils (%) (Auto) 1 % (0-3)  


 


Neutrophils # (Auto)


 4.4 x10^3/uL


(1.8-7.7) 





 


Lymphocytes # (Auto)


 2.3 x10^3/uL


(1.0-4.8) 





 


Monocytes # (Auto)


 0.6 x10^3/uL


(0.0-1.1) 





 


Eosinophils # (Auto)


 0.2 x10^3/uL


(0.0-0.7) 





 


Basophils # (Auto)


 0.1 x10^3/uL


(0.0-0.2) 





 


Sodium Level


 


 147 mmol/L


(136-145)


 


Potassium Level


 


 4.1 mmol/L


(3.5-5.1)


 


Chloride Level


 


 106 mmol/L


()


 


Carbon Dioxide Level


 


 29 mmol/L


(21-32)


 


Anion Gap  12 (6-14) 


 


Blood Urea Nitrogen


 


 27 mg/dL


(7-20)


 


Creatinine


 


 1.4 mg/dL


(0.6-1.0)


 


Estimated GFR


(Cockcroft-Gault) 


 36.8 





 


BUN/Creatinine Ratio  19 (6-20) 


 


Glucose Level


 


 156 mg/dL


(70-99)


 


Calcium Level


 


 9.3 mg/dL


(8.5-10.1)


 


Total Bilirubin


 


 1.1 mg/dL


(0.2-1.0)


 


Aspartate Amino Transf


(AST/SGOT) 


 23 U/L (15-37) 





 


Alanine Aminotransferase


(ALT/SGPT) 


 24 U/L (14-59) 





 


Alkaline Phosphatase


 


 69 U/L


()


 


Troponin I Quantitative


 


 0.025 ng/mL


(0.000-0.055)


 


Total Protein


 


 7.4 g/dL


(6.4-8.2)


 


Albumin


 


 3.6 g/dL


(3.4-5.0)


 


Albumin/Globulin Ratio  0.9 (1.0-1.7) 











VTE Prophylaxis Ordered


VTE Prophylaxis Devices:  No


VTE Pharmacological Prophylaxi:  Yes





Assessment/Plan


Assessment/Plan


intermittent chest pain and back pain for a week


currently no pain


accelerated malignant hypertension,    add norvasc,  PRN labetalol if SBP > 200


acute on chronic renal failure,    try to hydrate,   eGFR 36


Dm2,   add SSI,  hold metformin for renal fxn


r.o ACS


echo


may need CT angio, but renal function poor,  try to get BP down, if cr better 

may CT angio,  also no current pain











KEARA KING MD                  Nov 4, 2019 20:32

## 2019-11-05 NOTE — EKG
[facility]

              [address] [place] [address]

Test Date:    2019               Test Time:    16:27:48

Pat Name:     GINA JETT                 Department:   

Patient ID:   [id number]           Room:          

Gender:       F                        Technician:   

:          1945               Requested By: ELZBIETA VELIZ

Order Number: [id number]           Reading MD:     

                                 Measurements

Intervals                              Axis          

Rate:         65                       P:            -21

MI:           146                      QRS:          -5

QRSD:         92                       T:            115

QT:           446                                    

QTc:          469                                    

                           Interpretive Statements

SINUS RHYTHM

LEFTWARD AXIS

ST & T ABNORMALITY, CONSIDER

HIGH LATERAL ISCHEMIA OR LEFT VENTRICULAR STRAIN

ABNORMAL ECG

RI6.01

No previous ECG available for comparison

## 2019-11-05 NOTE — PDOC2
WALLACE,REINIERDEREK SUTHERLAND 11/5/19 1217:


CARDIAC CONSULT


DATE OF CONSULT


Date of Consult


DATE: 11/5/19 


TIME: 12:07





REASON FOR CONSULT


Reason for Consult:


Chest pain





REFERRING PHYSICIAN


Referring Physician:


KOKO Bustillo





SOURCE


Source:  Chart review, Patient





HISTORY OF PRESENT ILLNESS


HISTORY OF PRESENT ILLNESS


This is a 75 yo female who presented secondary to chest pain. Patient reports 

intermittent pain in her right chest for the past 3-4 days. Describes as 

burning//sharp in nature. Pain worsened with deep breathing and twisting her 

body a ceratin way. Non-radiating. 


 Was dizzy and mild SOA while down in emergency room with her . He was 

admitted to the 5th floor. Blood pressure significantly elevated in ED. 


Does have a history of CAD s/p CABG. Previously followed with  cardiology, but

has not been seen in some time. Would like further testing and f/u to be 

scheduled her as it is more convenient for family.





PAST MEDICAL HISTORY


Past Medical History


Cardiovascular:  CAD, HTN, Hyperlipidemia, Other (aortic dissection with repair 

in 2010)


CENTRAL NERVOUS SYSTEM:  Periperal neuropathy, Vertigo, Other (meningioma)


GI:  No pertinent hx


Musculoskeletal:  Osteoarthritis


Infectious disease:  No pertinent hx


ENT:  No pertinent hx


Renal/:  Chronic renal insuff (?)


Endocrine:  Diabetes (2)


Dermatology:  No pertinent hx





PAST SURGICAL HISTORY


Past Surgical History:  CABG (PCI/stent )





FAMILY HISTORY


Family History:  Other (noncontributory )





SOCIAL HISTORY


Social History


Smoke:  No


ALCOHOL:  none


Drugs:  None


Lives:  with Family





CURRENT MEDICATIONS


CURRENT MEDICATIONS





Current Medications








 Medications


  (Trade)  Dose


 Ordered  Sig/Kenroy


 Route


 PRN Reason  Start Time


 Stop Time Status Last Admin


Dose Admin


 


 Aspirin


  (Jovanny Aspirin)  325 mg  1X  ONCE


 PO


   11/4/19 16:45


 11/4/19 16:46 DC 11/4/19 17:56





 


 Famotidine


  (Pepcid Vial)  20 mg  1X  ONCE


 IVP


   11/4/19 16:45


 11/4/19 16:46 DC 11/4/19 17:57





 


 Amlodipine


 Besylate


  (Norvasc)  5 mg  1X  ONCE


 PO


   11/4/19 20:15


 11/4/19 20:16 DC 11/4/19 20:31





 


 Amlodipine


 Besylate


  (Norvasc)  5 mg  DAILY


 PO


   11/5/19 09:00


    11/5/19 08:49





 


 Aspirin


  (Ecotrin)  81 mg  DAILY


 PO


   11/5/19 09:00


    11/5/19 08:49





 


 Atorvastatin


 Calcium


  (Lipitor)  40 mg  QHS


 PO


   11/4/19 21:00


    11/4/19 20:30





 


 Benzonatate


  (Tessalon Perle)  100 mg  TID


 PO


   11/4/19 21:00


    11/5/19 08:50





 


 Losartan Potassium


  (Cozaar)  50 mg  DAILY


 PO


   11/5/19 09:00


    11/5/19 08:49





 


 Metoprolol


 Tartrate


  (Lopressor)  50 mg  BID


 PO


   11/4/19 21:00


    11/5/19 09:00





 


 Prednisone


  (Prednisone)  10 mg  DAILY


 PO


   11/5/19 09:00


    11/5/19 08:49





 


 Cetirizine HCl


  (ZyrTEC)  10 mg  DAILY


 PO


   11/5/19 09:00


    11/5/19 08:49





 


 Linagliptin


  (Tradjenta)  5 mg  DAILY


 PO


   11/5/19 09:00


    11/5/19 08:49





 


 Albuterol/


 Ipratropium


  (Duoneb)  3 ml  RTQID


 NEB


   11/5/19 08:00


    11/5/19 11:24





 


 Hydralazine HCl


  (Apresoline Inj)  10 mg  PRN Q4HRS  PRN


 IVP


 ELEVATED BP, SEE COMMENTS  11/4/19 20:15


    11/4/19 23:14





 


 Enoxaparin Sodium


  (Lovenox 30mg


 Syringe)  30 mg  Q24H


 SQ


   11/5/19 09:00


    11/5/19 08:50














ALLERGIES


ALLERGIES:  


Coded Allergies:  


     No Known Drug Allergies (Unverified , 2/26/16)





ROS


Review of System


14 point ROS conducted with pertinent positives noted above in HPI





PHYSICAL EXAM


PHYSICAL EXAM


General:  Alert, Oriented X3, Cooperative, mild distress


HEENT:  Atraumatic, Mucous membr. moist/pink


Lungs:  CTA


Heart:  Regular rate (SR), Normal S1, Normal S2, No murmurs


Abdomen:  Soft, No tenderness


Extremities:  No cyanosis, No edema


Skin:  No breakdown, No significant lesion


Neuro:  Normal speech, Sensation intact


Psych/Mental Status:  Mental status NL, Mood NL


MUSCULOSKELETAL:  Osteoarthritic changes both hands





VITALS/I&O


VITALS/I&O:





                                   Vital Signs








  Date Time  Temp Pulse Resp B/P (MAP) Pulse Ox O2 Delivery O2 Flow Rate FiO2


 


11/5/19 11:25     96 Nasal Cannula 2.0 


 


11/5/19 09:00  65      


 


11/5/19 07:00 98.7  18 123/66 (85)    





 98.7       














                                    I & O   


 


 11/4/19 11/4/19 11/5/19





 15:00 23:00 07:00


 


Intake Total   0 ml


 


Balance   0 ml











LABS


Lab:





                                Laboratory Tests








Test


 11/4/19


16:50 11/4/19


17:35 11/4/19


20:58 11/4/19


21:30


 


White Blood Count


 7.6 x10^3/uL


(4.0-11.0) 


 


 





 


Red Blood Count


 5.28 x10^6/uL


(3.50-5.40) 


 


 





 


Hemoglobin


 15.2 g/dL


(12.0-15.5) 


 


 





 


Hematocrit


 45.2 %


(36.0-47.0) 


 


 





 


Mean Corpuscular Volume


 86 fL ()


 


 


 





 


Mean Corpuscular Hemoglobin 29 pg (25-35)     


 


Mean Corpuscular Hemoglobin


Concent 34 g/dL


(31-37) 


 


 





 


Red Cell Distribution Width


 15.1 %


(11.5-14.5)  H 


 


 





 


Platelet Count


 118 x10^3/uL


(140-400)  L 


 


 





 


Neutrophils (%) (Auto) 58 % (31-73)     


 


Lymphocytes (%) (Auto) 30 % (24-48)     


 


Monocytes (%) (Auto) 8 % (0-9)     


 


Eosinophils (%) (Auto) 3 % (0-3)     


 


Basophils (%) (Auto) 1 % (0-3)     


 


Neutrophils # (Auto)


 4.4 x10^3/uL


(1.8-7.7) 


 


 





 


Lymphocytes # (Auto)


 2.3 x10^3/uL


(1.0-4.8) 


 


 





 


Monocytes # (Auto)


 0.6 x10^3/uL


(0.0-1.1) 


 


 





 


Eosinophils # (Auto)


 0.2 x10^3/uL


(0.0-0.7) 


 


 





 


Basophils # (Auto)


 0.1 x10^3/uL


(0.0-0.2) 


 


 





 


Sodium Level


 


 147 mmol/L


(136-145)  H 


 





 


Potassium Level


 


 4.1 mmol/L


(3.5-5.1) 


 





 


Chloride Level


 


 106 mmol/L


() 


 





 


Carbon Dioxide Level


 


 29 mmol/L


(21-32) 


 





 


Anion Gap  12 (6-14)    


 


Blood Urea Nitrogen


 


 27 mg/dL


(7-20)  H 


 





 


Creatinine


 


 1.4 mg/dL


(0.6-1.0)  H 


 





 


Estimated GFR


(Cockcroft-Gault) 


 36.8  


 


 





 


BUN/Creatinine Ratio  19 (6-20)    


 


Glucose Level


 


 156 mg/dL


(70-99)  H 


 





 


Calcium Level


 


 9.3 mg/dL


(8.5-10.1) 


 





 


Total Bilirubin


 


 1.1 mg/dL


(0.2-1.0)  H 


 





 


Aspartate Amino Transferase


(AST) 


 23 U/L (15-37)


 


 





 


Alanine Aminotransferase (ALT)


 


 24 U/L (14-59)


 


 





 


Alkaline Phosphatase


 


 69 U/L


() 


 





 


Troponin I Quantitative


 


 0.025 ng/mL


(0.000-0.055) 


 0.028 ng/mL


(0.000-0.055)


 


Total Protein


 


 7.4 g/dL


(6.4-8.2) 


 





 


Albumin


 


 3.6 g/dL


(3.4-5.0) 


 





 


Albumin/Globulin Ratio


 


 0.9 (1.0-1.7)


L 


 





 


Glucose (Fingerstick)


 


 


 208 mg/dL


(70-99)  H 





 


Test


 11/5/19


03:20 11/5/19


08:04 


 





 


White Blood Count


 5.5 x10^3/uL


(4.0-11.0) 


 


 





 


Red Blood Count


 5.19 x10^6/uL


(3.50-5.40) 


 


 





 


Hemoglobin


 14.6 g/dL


(12.0-15.5) 


 


 





 


Hematocrit


 44.0 %


(36.0-47.0) 


 


 





 


Mean Corpuscular Volume


 85 fL ()


 


 


 





 


Mean Corpuscular Hemoglobin 28 pg (25-35)     


 


Mean Corpuscular Hemoglobin


Concent 33 g/dL


(31-37) 


 


 





 


Red Cell Distribution Width


 14.5 %


(11.5-14.5) 


 


 





 


Platelet Count


 101 x10^3/uL


(140-400)  L 


 


 





 


Neutrophils (%) (Auto) 54 % (31-73)     


 


Lymphocytes (%) (Auto) 33 % (24-48)     


 


Monocytes (%) (Auto) 9 % (0-9)     


 


Eosinophils (%) (Auto) 4 % (0-3)  H   


 


Basophils (%) (Auto) 1 % (0-3)     


 


Neutrophils # (Auto)


 3.0 x10^3/uL


(1.8-7.7) 


 


 





 


Lymphocytes # (Auto)


 1.8 x10^3/uL


(1.0-4.8) 


 


 





 


Monocytes # (Auto)


 0.5 x10^3/uL


(0.0-1.1) 


 


 





 


Eosinophils # (Auto)


 0.2 x10^3/uL


(0.0-0.7) 


 


 





 


Basophils # (Auto)


 0.0 x10^3/uL


(0.0-0.2) 


 


 





 


Sodium Level


 145 mmol/L


(136-145) 


 


 





 


Potassium Level


 3.4 mmol/L


(3.5-5.1)  L 


 


 





 


Chloride Level


 105 mmol/L


() 


 


 





 


Carbon Dioxide Level


 32 mmol/L


(21-32) 


 


 





 


Anion Gap 8 (6-14)     


 


Blood Urea Nitrogen


 20 mg/dL


(7-20) 


 


 





 


Creatinine


 1.1 mg/dL


(0.6-1.0)  H 


 


 





 


Estimated GFR


(Cockcroft-Gault) 48.6  


 


 


 





 


BUN/Creatinine Ratio 18 (6-20)     


 


Glucose Level


 173 mg/dL


(70-99)  H 


 


 





 


Calcium Level


 9.0 mg/dL


(8.5-10.1) 


 


 





 


Total Bilirubin


 1.3 mg/dL


(0.2-1.0)  H 


 


 





 


Aspartate Amino Transferase


(AST) 21 U/L (15-37)


 


 


 





 


Alanine Aminotransferase (ALT)


 21 U/L (14-59)


 


 


 





 


Alkaline Phosphatase


 70 U/L


() 


 


 





 


Total Protein


 7.2 g/dL


(6.4-8.2) 


 


 





 


Albumin


 3.5 g/dL


(3.4-5.0) 


 


 





 


Albumin/Globulin Ratio


 0.9 (1.0-1.7)


L 


 


 





 


Glucose (Fingerstick)


 


 180 mg/dL


(70-99)  H 


 








                                Laboratory Tests


11/4/19 16:50








11/5/19 03:20








                                Laboratory Tests


11/4/19 17:35








11/5/19 03:20











ECHOCARDIOGRAM


ECHOCARDIOGRAM


<Conclusion>


The left ventricular systolic function is normal and the ejection fraction is 

within normal range. The Ejection Fraction is 65-70%.


There is moderate concentric left ventricular hypertrophy.


There is normal LV segmental wall motion.


Doppler and Color Flow revealed trace to mild tricuspid regurgitation. The PA 

pressure was estimated at 48 mmHg.





DATE:     11/05/19 0927





ASSESSMENT/PLAN


ASSESSMENT/PLAN


1.  Chest pain, atypical. Trop slightly elevated at 0.028. Most probably type 

II, demand medicated secondary to #2. Echo today with preserved LV systolic 

function, no WMA as noted above


2.  Malignant hypertension; better controlled with medication titration


3.  CAD s/p CABG 2010


4.  Aortic dissection s/p repair 2010 


4.  Hyperlipidemia; statin 


5.  Diabetes, II


6 . CKD


7.  Hypokalemia 














Recommendations





Secondary prevention measures


Lipid panel


Replace K 


Will arrange for outpatient stress test and f/u 

















ALESSIA VALVERDE MD 11/5/19 1941:


CARDIAC CONSULT


ASSESSMENT/PLAN


ASSESSMENT/PLAN


Patient seen and examined.  Agree with NP's assessment and plan


CP with atypical features


Slight trop elevation prob demand ischemia


2D echo showed normal LVF without any WMA


Plan ischemic evaluation as outpatient


BP better controlled since admission


Thank you for your consultation











REINIER GONSALEZ            Nov 5, 2019 12:17


ALESSIA VALVERDE MD            Nov 5, 2019 19:41

## 2019-11-05 NOTE — NUR
Discharge:

Teaching verbal and written. Reviewed medication, norvasc, chest pain, hypertension, 
hyperkalemia, ect. Patients daughter in law Xai verbilized understanding. IV removed without 
complications, catheter tip in tact. 1 prescription for Norvasc given to patient. All 
belongings with patient. Patient assisted off of unit via wheelchair accompanied by family 
and CNA. patient went to visit her  which is a patient on 5th floor.

## 2019-11-05 NOTE — CARD
MR#: [id number]

Account#: [id number]

Accession#: [id number]

Date of Study: 11/05/2019

Ordering Physician: KEARA KING, 

Referring Physician: KEARA KING, 

Tech: Tory Mireles RDCS





--------------- APPROVED REPORT --------------





EXAM: Two-dimensional and M-mode echocardiogram with Doppler and color Doppler.



Other Information 

Quality : AverageHR: 60bpm

Rhythm : NSR



INDICATION

Hypertension/HCVD



2D DIMENSIONS 

RVDd2.9 (2.9-3.5cm)Left Atrium(2D)4.5 (1.6-4.0cm)

IVSd1.9 (0.7-1.1cm)Aortic Root(2D)3.0 (2.0-3.7cm)

LVDd2.9 (3.9-5.9cm)LVOT Diameter1.8 (1.8-2.4cm)

PWd1.3 (0.7-1.1cm)LVDs1.8 (2.5-4.0cm)

FS (%) 38.8 %SV22.5 ml

LVEF(%)70.9 (>50%)



Aortic Valve

AoV Peak Brian.137.3cm/sAoV VTI24.4cm

AO Peak GR.7.5mmHgLVOT  VTI 14.91cm

AO Mean GR.4mmHgAVA   (VTI)1.60cm2



Mitral Valve

MV E Vkhnfqfw35.3cm/sMV DECEL IPSM268of

MV A Fixuvqhb27.6cm/sE/A  Ratio1.5

MV A Hhsuvbov96dx



TDI

Lateral E' P. V6.64cm/sMedial E' P. V5.69cm/s

E/Lateral E'12.8E/Medial E'15.0



Tricuspid Valve

TR P. Dwrsttbo415dc/sRAP SYHUUHQB2ibMo

TR Peak Gr.14oiZtMZGS33boFr



 LEFT VENTRICLE 

The left ventricle cavity is small. There is moderate concentric left ventricular hypertrophy. The le
ft ventricular systolic function is normal and the ejection fraction is within normal range. The Ejec
tion Fraction is 65-70%. There is normal LV segmental wall motion. Transmitral Doppler flow pattern i
s abnormal.



 RIGHT VENTRICLE 

The right ventricle is normal size. There is normal right ventricular wall thickness. The right ventr
icular systolic function is normal.



 ATRIA 

The left atrium size is normal. The right atrium size is normal. The interatrial septum is intact wit
h no evidence for an atrial septal defect or patent foramen ovale as noted on 2-D or Doppler imaging.




 AORTIC VALVE 

The aortic valve is mildly calcified. The aortic valve is trileaflet. Doppler and Color Flow revealed
 trace to mild aortic regurgitation. There is no significant aortic valvular stenosis. There is no ao
rtic valvular vegetation.



 MITRAL VALVE 

The mitral valve is thickened but opens well. There is no evidence of mitral valve prolapse. There is
 no mitral valve stenosis. Doppler and Color-flow revealed trace mitral regurgitation.



 TRICUSPID VALVE 

The tricuspid valve is normal in structure and function. Doppler and Color Flow revealed trace to mil
d tricuspid regurgitation. The PA pressure was estimated at 48 mmHg. There is no tricuspid valve prol
apse or vegetation. There is no tricuspid valve stenosis.



 PULMONIC VALVE 

The pulmonic valve is not well visualized.



 GREAT VESSELS 

The aortic root is normal in size. The ascending aorta is normal in size. The IVC is dilated and yasmani
apses >50% with inspiration.



 PERICARDIAL EFFUSION 

There is no evidence of significant pericardial effusion.



Critical Notification

Critical Value: No



<Conclusion>

The left ventricular systolic function is normal and the ejection fraction is within normal range. Th
e Ejection Fraction is 65-70%.

There is moderate concentric left ventricular hypertrophy.

There is normal LV segmental wall motion.

Doppler and Color Flow revealed trace to mild tricuspid regurgitation. The PA pressure was estimated 
at 48 mmHg.



Signed by : Brayan Hrarington, 

Electronically Approved : 11/05/2019 09:30:24

## 2019-11-05 NOTE — PDOC
[facility] PROGRESS NOTE


Chief Complaint


Chief Complaint


chest pain


malignant hypertension


acute on chronic renal failure


DM2





History of Present Illness


History of Present Illness


11/5/19


Pt seen and examined. Blood pressure 123/66 this a.m. then 160s/70s at 1100.  

Was 247/97 last night. Echo was done today and showed normal EF.





Vitals/I&O


Vitals/I&O:





                                   Vital Signs








  Date Time  Temp Pulse Resp B/P (MAP) Pulse Ox O2 Delivery O2 Flow Rate FiO2


 


11/5/19 11:25     96 Nasal Cannula 2.0 


 


11/5/19 11:00 97.2 65 18 164/76 (105)    





 97.2       














                                    I & O   


 


 11/4/19 11/4/19 11/5/19





 15:00 23:00 07:00


 


Intake Total   0 ml


 


Balance   0 ml











Physical Exam


General:  Alert, Oriented X3, Cooperative, No acute distress


Heart:  Regular rate, Normal S1, Normal S2


Lungs:  Wheezing


Abdomen:  Normal bowel sounds, Soft


Extremities:  No clubbing, No edema


Skin:  No rashes, No significant lesion





Labs


Labs:





Laboratory Tests








Test


 11/4/19


16:50 11/4/19


17:35 11/4/19


20:58 11/4/19


21:30


 


White Blood Count


 7.6 x10^3/uL


(4.0-11.0) 


 


 





 


Red Blood Count


 5.28 x10^6/uL


(3.50-5.40) 


 


 





 


Hemoglobin


 15.2 g/dL


(12.0-15.5) 


 


 





 


Hematocrit


 45.2 %


(36.0-47.0) 


 


 





 


Mean Corpuscular Volume 86 fL ()    


 


Mean Corpuscular Hemoglobin 29 pg (25-35)    


 


Mean Corpuscular Hemoglobin


Concent 34 g/dL


(31-37) 


 


 





 


Red Cell Distribution Width


 15.1 %


(11.5-14.5) 


 


 





 


Platelet Count


 118 x10^3/uL


(140-400) 


 


 





 


Neutrophils (%) (Auto) 58 % (31-73)    


 


Lymphocytes (%) (Auto) 30 % (24-48)    


 


Monocytes (%) (Auto) 8 % (0-9)    


 


Eosinophils (%) (Auto) 3 % (0-3)    


 


Basophils (%) (Auto) 1 % (0-3)    


 


Neutrophils # (Auto)


 4.4 x10^3/uL


(1.8-7.7) 


 


 





 


Lymphocytes # (Auto)


 2.3 x10^3/uL


(1.0-4.8) 


 


 





 


Monocytes # (Auto)


 0.6 x10^3/uL


(0.0-1.1) 


 


 





 


Eosinophils # (Auto)


 0.2 x10^3/uL


(0.0-0.7) 


 


 





 


Basophils # (Auto)


 0.1 x10^3/uL


(0.0-0.2) 


 


 





 


Sodium Level


 


 147 mmol/L


(136-145) 


 





 


Potassium Level


 


 4.1 mmol/L


(3.5-5.1) 


 





 


Chloride Level


 


 106 mmol/L


() 


 





 


Carbon Dioxide Level


 


 29 mmol/L


(21-32) 


 





 


Anion Gap  12 (6-14)   


 


Blood Urea Nitrogen


 


 27 mg/dL


(7-20) 


 





 


Creatinine


 


 1.4 mg/dL


(0.6-1.0) 


 





 


Estimated GFR


(Cockcroft-Gault) 


 36.8 


 


 





 


BUN/Creatinine Ratio  19 (6-20)   


 


Glucose Level


 


 156 mg/dL


(70-99) 


 





 


Calcium Level


 


 9.3 mg/dL


(8.5-10.1) 


 





 


Total Bilirubin


 


 1.1 mg/dL


(0.2-1.0) 


 





 


Aspartate Amino Transf


(AST/SGOT) 


 23 U/L (15-37) 


 


 





 


Alanine Aminotransferase


(ALT/SGPT) 


 24 U/L (14-59) 


 


 





 


Alkaline Phosphatase


 


 69 U/L


() 


 





 


Troponin I Quantitative


 


 0.025 ng/mL


(0.000-0.055) 


 0.028 ng/mL


(0.000-0.055)


 


Total Protein


 


 7.4 g/dL


(6.4-8.2) 


 





 


Albumin


 


 3.6 g/dL


(3.4-5.0) 


 





 


Albumin/Globulin Ratio  0.9 (1.0-1.7)   


 


Glucose (Fingerstick)


 


 


 208 mg/dL


(70-99) 





 


Test


 11/5/19


03:20 11/5/19


08:04 11/5/19


11:45 





 


White Blood Count


 5.5 x10^3/uL


(4.0-11.0) 


 


 





 


Red Blood Count


 5.19 x10^6/uL


(3.50-5.40) 


 


 





 


Hemoglobin


 14.6 g/dL


(12.0-15.5) 


 


 





 


Hematocrit


 44.0 %


(36.0-47.0) 


 


 





 


Mean Corpuscular Volume 85 fL ()    


 


Mean Corpuscular Hemoglobin 28 pg (25-35)    


 


Mean Corpuscular Hemoglobin


Concent 33 g/dL


(31-37) 


 


 





 


Red Cell Distribution Width


 14.5 %


(11.5-14.5) 


 


 





 


Platelet Count


 101 x10^3/uL


(140-400) 


 


 





 


Neutrophils (%) (Auto) 54 % (31-73)    


 


Lymphocytes (%) (Auto) 33 % (24-48)    


 


Monocytes (%) (Auto) 9 % (0-9)    


 


Eosinophils (%) (Auto) 4 % (0-3)    


 


Basophils (%) (Auto) 1 % (0-3)    


 


Neutrophils # (Auto)


 3.0 x10^3/uL


(1.8-7.7) 


 


 





 


Lymphocytes # (Auto)


 1.8 x10^3/uL


(1.0-4.8) 


 


 





 


Monocytes # (Auto)


 0.5 x10^3/uL


(0.0-1.1) 


 


 





 


Eosinophils # (Auto)


 0.2 x10^3/uL


(0.0-0.7) 


 


 





 


Basophils # (Auto)


 0.0 x10^3/uL


(0.0-0.2) 


 


 





 


Sodium Level


 145 mmol/L


(136-145) 


 


 





 


Potassium Level


 3.4 mmol/L


(3.5-5.1) 


 


 





 


Chloride Level


 105 mmol/L


() 


 


 





 


Carbon Dioxide Level


 32 mmol/L


(21-32) 


 


 





 


Anion Gap 8 (6-14)    


 


Blood Urea Nitrogen


 20 mg/dL


(7-20) 


 


 





 


Creatinine


 1.1 mg/dL


(0.6-1.0) 


 


 





 


Estimated GFR


(Cockcroft-Gault) 48.6 


 


 


 





 


BUN/Creatinine Ratio 18 (6-20)    


 


Glucose Level


 173 mg/dL


(70-99) 


 


 





 


Calcium Level


 9.0 mg/dL


(8.5-10.1) 


 


 





 


Magnesium Level


 1.5 mg/dL


(1.8-2.4) 


 


 





 


Total Bilirubin


 1.3 mg/dL


(0.2-1.0) 


 


 





 


Aspartate Amino Transf


(AST/SGOT) 21 U/L (15-37) 


 


 


 





 


Alanine Aminotransferase


(ALT/SGPT) 21 U/L (14-59) 


 


 


 





 


Alkaline Phosphatase


 70 U/L


() 


 


 





 


Troponin I Quantitative


 0.017 ng/mL


(0.000-0.055) 


 


 





 


Total Protein


 7.2 g/dL


(6.4-8.2) 


 


 





 


Albumin


 3.5 g/dL


(3.4-5.0) 


 


 





 


Albumin/Globulin Ratio 0.9 (1.0-1.7)    


 


Triglycerides Level


 129 mg/dL


(0-150) 


 


 





 


Cholesterol Level


 131 mg/dL


(0-200) 


 


 





 


LDL Cholesterol, Calculated


 65 mg/dL


(0-100) 


 


 





 


VLDL Cholesterol, Calculated


 26 mg/dL


(0-40) 


 


 





 


Non-HDL Cholesterol Calculated


 91 mg/dL


(0-129) 


 


 





 


HDL Cholesterol


 41 mg/dL


(40-60) 


 


 





 


Cholesterol/HDL Ratio 3.2    


 


Glucose (Fingerstick)


 


 180 mg/dL


(70-99) 186 mg/dL


(70-99) 














Review of Systems


Review of Systems:


No headache.


Yes chest pain





Assessment and Plan


Assessmemt and Plan


Problems


Medical Problems:


(1) Acute on chronic renal failure


Status: Acute  





(2) Back pain


Status: Chronic  





(3) Chest pain


Status: Acute  





(4) Intermittent chest pain


Status: Acute  





(5) Malignant hypertension


Status: Chronic  





(6) T2DM (type 2 diabetes mellitus)


Status: Chronic 





chest pain


malignant hypertension


acute on chronic renal failure


Dm2,   add SSI,  hold metformin for renal fxn





cardiac monitoring, 


serial enzymes, 


serial EKG


Discussed with nurse.


Hope to discharge if ok with cardiology


Norvasc PO


Metoprolol Tartrate PO





Comment


Review of Relevant


I have reviewed the following items emerita (where applicable) has been applied.


Medications:





Current Medications








 Medications


  (Trade)  Dose


 Ordered  Sig/Kenroy


 Route


 PRN Reason  Start Time


 Stop Time Status Last Admin


Dose Admin


 


 Aspirin


  (Jovanny Aspirin)  325 mg  1X  ONCE


 PO


   11/4/19 16:45


 11/4/19 16:46 DC 11/4/19 17:56





 


 Famotidine


  (Pepcid Vial)  20 mg  1X  ONCE


 IVP


   11/4/19 16:45


 11/4/19 16:46 DC 11/4/19 17:57





 


 Amlodipine


 Besylate


  (Norvasc)  5 mg  1X  ONCE


 PO


   11/4/19 20:15


 11/4/19 20:16 DC 11/4/19 20:31





 


 Amlodipine


 Besylate


  (Norvasc)  5 mg  DAILY


 PO


   11/5/19 09:00


    11/5/19 08:49





 


 Aspirin


  (Ecotrin)  81 mg  DAILY


 PO


   11/5/19 09:00


    11/5/19 08:49





 


 Atorvastatin


 Calcium


  (Lipitor)  40 mg  QHS


 PO


   11/4/19 21:00


    11/4/19 20:30





 


 Benzonatate


  (Tessalon Perle)  100 mg  TID


 PO


   11/4/19 21:00


    11/5/19 08:50





 


 Losartan Potassium


  (Cozaar)  50 mg  DAILY


 PO


   11/5/19 09:00


    11/5/19 08:49





 


 Metoprolol


 Tartrate


  (Lopressor)  50 mg  BID


 PO


   11/4/19 21:00


    11/5/19 09:00





 


 Prednisone


  (Prednisone)  10 mg  DAILY


 PO


   11/5/19 09:00


    11/5/19 08:49





 


 Cetirizine HCl


  (ZyrTEC)  10 mg  DAILY


 PO


   11/5/19 09:00


    11/5/19 08:49





 


 Linagliptin


  (Tradjenta)  5 mg  DAILY


 PO


   11/5/19 09:00


    11/5/19 08:49





 


 Albuterol/


 Ipratropium


  (Duoneb)  3 ml  RTQID


 NEB


   11/5/19 08:00


    11/5/19 11:24





 


 Hydralazine HCl


  (Apresoline Inj)  10 mg  PRN Q4HRS  PRN


 IVP


 ELEVATED BP, SEE COMMENTS  11/4/19 20:15


    11/4/19 23:14





 


 Enoxaparin Sodium


  (Lovenox 30mg


 Syringe)  30 mg  Q24H


 SQ


   11/5/19 09:00


    11/5/19 08:50




















MEHDI CONNER III DO            Nov 5, 2019 13:27

## 2019-11-05 NOTE — NUR
SS following for discharge planning. SS reviewed pt chart. Pt is from home with family and 
is currently on room air. SS will continue to follow for discharge planning.

## 2019-11-05 NOTE — NUR
Patient arrived at 2000 via wheelchair and escorted by family. Patient only speaks John and 
family translated. Orders verified and reviewed with Dr. Meeks.

## 2020-03-12 NOTE — RAD
EXAM: AP View of the chest

 

DATE: 3/11/2020 11:26 PM

 

INDICATION: Cough

 

COMPARISON: 11/4/2019, 1/15/2019

 

FINDINGS/

IMPRESSION:

Heart is mildly enlarged. Atherosclerotic calcifications of the tortuous 

aorta are seen. Bilateral interstitial prominence is seen, possibly 

atypical infectious or inflammatory process or interstitial edema. Trace 

left pleural effusion. No pneumothorax.

 

Electronically signed by: Robby Ho MD (3/12/2020 12:12 AM) [id number] negative...

## 2020-03-12 NOTE — PHYS DOC
Past Medical History


Past Medical History:  COPD, Diabetes-Type II, Hypertension


Additional Past Medical Histor:  HIGH CHOLESTEROL


Past Surgical History:  No Surgical History


Additional Past Surgical Histo:  Cardiac Stents - UNKNOWN NUMBER


Smoking Status:  Never Smoker


Alcohol Use:  None


Drug Use:  None





Adult General


Chief Complaint


Chief Complaint:  FLU SYMPTOM





HPI


HPI





Patient is a 74  year old female with a history of diabetes type 2, 

hypertension, COPD non-smoker who presents to the ED today complaining of cough 

and fever that began today.  Patient is in the ED with her son who is 

interpreting for [employer] language, the son reports patient was exposed to 

influenza from the grandchild who has the disease.





Review of Systems


Review of Systems





Constitutional: Reports fever


Eyes: Denies change in visual acuity, redness, or eye pain []


HENT: Denies nasal congestion or sore throat []


Respiratory: Reports cough, denies shortness of breath []


Cardiovascular: No additional information not addressed in HPI []


GI: Denies abdominal pain, nausea, vomiting, bloody stools or diarrhea []


: Denies dysuria or hematuria []


Musculoskeletal: Denies back pain or joint pain []


Integument: Denies rash or skin lesions []


Neurologic: Denies headache, focal weakness or sensory changes []








All other systems were reviewed and found to be within normal limits, except as 

documented in this note.





Current Medications


Current Medications





Current Medications








 Medications


  (Trade)  Dose


 Ordered  Sig/Kenroy  Start Time


 Stop Time Status Last Admin


Dose Admin


 


 Acetaminophen


  (Tylenol)  1,000 mg  1X  ONCE  3/11/20 23:45


 3/11/20 23:46 DC 3/11/20 23:49


1,000 MG


 


 Albuterol/


 Ipratropium


  (Duoneb)  3 ml  1X  ONCE  3/11/20 23:45


 3/11/20 23:46 DC 3/11/20 23:58


3 ML


 


 Ceftriaxone Sodium


  (Rocephin Im)  1 gm  1X  ONCE  3/12/20 00:45


 3/12/20 00:46 UNV  





 


 Clonidine HCl


  (Catapres)  0.1 mg  1X  ONCE  3/12/20 00:45


 3/12/20 00:46 UNV  





 


 Lidocaine HCl


  (Xylocaine-Mpf


 1% 2ml Vial)  2 ml  1X  ONCE  3/12/20 00:45


 3/12/20 00:46 UNV  





 


 Prednisone


  (Prednisone)  60 mg  1X  ONCE  3/11/20 23:45


 3/11/20 23:46 DC 3/11/20 23:49


60 MG











Allergies


Allergies





Allergies








Coded Allergies Type Severity Reaction Last Updated Verified


 


  No Known Drug Allergies    2/26/16 No











Physical Exam


Physical Exam





Constitutional: Well developed, well nourished, no acute distress, non-toxic 

appearance. []


HENT: Normocephalic, atraumatic, bilateral external ears normal, oropharynx 

moist, no oral exudates, nose normal. []


Eyes: PERRLA, EOMI, conjunctiva normal, no discharge. [] 


Neck: Normal range of motion, no tenderness, supple, no stridor. [] 


Cardiovascular:Heart rate regular rhythm, no murmur []


Lungs & Thorax: Diminished breath sounds to posterior lung bases


Abdomen: Bowel sounds normal, soft, no tenderness, no masses, no pulsatile 

masses. [] 


Skin: Warm, dry, no erythema, no rash. [] 


Back: No tenderness, no CVA tenderness. [] 


Extremities: No tenderness, no cyanosis, no clubbing, ROM intact, no edema. [] 


Neurologic: Alert and oriented X 2, normal motor function, normal sensory 

function, no focal deficits noted.  Cranial nerves II through XII intact 

communication is difficult due to language barrier


Psychologic: Affect normal, judgement normal, mood normal. []





Current Patient Data


Vital Signs





                                   Vital Signs








  Date Time  Temp Pulse Resp B/P (MAP) Pulse Ox O2 Delivery O2 Flow Rate FiO2


 


3/11/20 23:58     92 Nasal Cannula 3.0 


 


3/11/20 23:15 101.0 80 22 215/94 (134)    





 101.0       








Lab Values





                                Laboratory Tests








Test


 3/11/20


23:59


 


Influenza Type A Antigen


 Positive


(NEGATIVE)


 


Influenza Type B Antigen


 Negative


(NEGATIVE)











EKG


EKG


[]





Radiology/Procedures


Radiology/Procedures


[]PROCEDURE: PORTABLE CHEST 1V





EXAM: AP View of the chest


 


DATE: 3/11/2020 11:26 PM


 


INDICATION: Cough


 


COMPARISON: 11/4/2019, 1/15/2019


 


FINDINGS/


IMPRESSION:


Heart is mildly enlarged. Atherosclerotic calcifications of the tortuous 


aorta are seen. Bilateral interstitial prominence is seen, possibly 


atypical infectious or inflammatory process or interstitial edema. Trace 


left pleural effusion. No pneumothorax.


 


Electronically signed by: Robby Ho MD (3/12/2020 12:12 AM) [id number]














DICTATED and SIGNED BY:     ROBBY HO MD


DATE:     03/12/20 0012





Course & Med Decision Making


Course & Med Decision Making


Pertinent Labs and Imaging studies reviewed. (See chart for details)





This is a 74-year-old female patient presenting to the ED today with cough and 

fever that began today after being exposed to influenza from another family 

member.  Family report PCP was already contacted and prescription for Tamiflu 

was ordered but not picked up yet.  Patient was given 1 dose of Tamiflu from 

another family member.  Patient arrives in the ED with a temperature of 101.  

Patient was given Tylenol, positive for influenza A.  Chest x-ray shows possible

 atypical infection or inflammatory process or interstitial edema.  Patient was 

given a shot of Rocephin in the ED.  Discharged with prescription for 

doxycycline.  Follow-up with primary care doctor in the course of this week.





Dragon Disclaimer


Dragon Disclaimer


This electronic medical record was generated, in whole or in part, using a voice

 recognition dictation system.





Departure


Departure


Impression:  


   Primary Impression:  


   Influenza A


   Additional Impressions:  


   Fever


   Infiltrate of lung present on chest x-ray


Disposition:  01 HOME, SELF-CARE


Condition:  STABLE


Referrals:  


LEÓN AMBROCIO MD (PCP)


Follow-up in the course of this week


Patient Instructions:  Fever, Adult, Easy-to-Read, Influenza A (H1N1), 

Pneumonia, Adult





Additional Instructions:  


You have influenza A and possible pneumonia.  Continue taking Tamiflu that your 

doctor ordered.  Please take Tylenol/Motrin for pain or fever.  We will also put

 you on antibiotics for pneumonia ensure you complete them.  Rest, push fluids. 

 Follow-up with your doctor in the course of this week.


Scripts


Doxycycline Hyclate (DOXYCYCLINE HYCLATE) 100 Mg Tablet


1 TAB PO BID, #14 TAB


   Prov: GIUSEPPE SMALLWOOD APRN         3/12/20





Problem Qualifiers








   Additional Impressions:  


   Fever


   Fever type:  unspecified  Qualified Codes:  R50.9 - Fever, unspecified








GIUSEPPE SMALLWOOD APRN              Mar 12, 2020 00:48

## 2020-05-31 NOTE — PHYS DOC
Past Medical History


Past Medical History:  COPD, Diabetes-Type II, Hypertension


Additional Past Medical Histor:  HIGH CHOLESTEROL


Past Surgical History:  No Surgical History


Additional Past Surgical Histo:  Cardiac Stents - UNKNOWN NUMBER


Smoking Status:  Never Smoker


Alcohol Use:  None


Drug Use:  None





General Adult


EDM:


Chief Complaint:  WEAKNESS/GENERALIZED





HPI:


HPI:





Patient is a 74  year old female presenting to the ED with a chief complaint of 

weakness and numbness in her left upper extremity.  Daughter is translating for 

patient.  Patient states that the symptoms started yesterday afternoon.  Patient

does have a history of COPD, diabetes and high blood pressure.  Patient does not

take any blood thinners.  Patient also denies an active smoker.  Patient does 

use an unknown amount of oxygen at home.  Patient denies headache, slurred 

speech, trouble walking.  Patient states that she has a weaker  in her left 

upper extremity than before.





Review of Systems:


Review of Systems:


Constitutional:  Denies fever or chills. []


Eyes:  Denies change in visual acuity. []


HENT:  Denies nasal congestion or sore throat. [] 


Respiratory:  Denies cough or shortness of breath. [] 


Cardiovascular:  Denies chest pain or edema. [] 


GI:  Denies abdominal pain, nausea, vomiting


:  Denies dysuria. [] 


Neurologic: Complains about left upper extremity weakness and numbness





Heart Score:


Risk Factors:


Risk Factors:  DM, Current or recent (<one month) smoker, HTN, HLP, family 

history of CAD, obesity.


Risk Scores:


Score 0 - 3:  2.5% MACE over next 6 weeks - Discharge Home


Score 4 - 6:  20.3% MACE over next 6 weeks - Admit for Clinical Observation


Score 7 - 10:  72.7% MACE over next 6 weeks - Early Invasive Strategies





Allergies:


Allergies:





Allergies








Coded Allergies Type Severity Reaction Last Updated Verified


 


  No Known Drug Allergies    2/26/16 No











Physical Exam:


PE:





Constitutional: Well developed, well nourished, no acute distress, non-toxic 

appearance. []


HENT: Normocephalic, atraumatic


Eyes: EOMI


Neck: Normal range of motion, Supple


Cardiovascular:Heart rate regular rhythm


Lungs & Thorax:  Bilateral breath sounds clear to auscultation []


Abdomen: Bowel sounds normal, soft, no tenderness


Extremities: No tenderness, ROM intact


Neurologic: Alert and oriented X 3, equal strength in both upper extremities.  

Patient states that her sensation is altered in her left upper extremity 

compared to the right.





EKG:


EKG:


[]





Radiology/Procedures:


Radiology/Procedures:


[]


Impression:


CT Head IMPRESSION: 





No new intracranial abnormality is seen. Stable study.


 


Stable right frontal partially calcified meningioma. Small subcentimeter 


meningioma versus focal cortical thickening of the inner table of the 


skull of the posterior left parietal region which is stable.


 


Stable chronic small vessel ischemic disease.


 


On the previous study, a small cutaneous nodule was seen in the left 


frontal area. This is again evident and is unchanged.





Course & Med Decision Making:


Course & Med Decision Making


Pertinent Labs and Imaging studies reviewed. (See chart for details)





Ordered labs, EKG, UA, CT head





CT head shows a stable meningioma there is been seen in the past imaging.





Labs are within normal limits.





Discussed results and plan of care with patient and family.





Due to patient's presenting symptoms, patient will be admitted for further 

evaluation and treatment.





I will discuss case with hospitalist service for admission.





Discussed case with Dr. Lindsey who accepts admission.





Dragon Disclaimer:


Dragon Disclaimer:


This electronic medical record was generated, in whole or in part, using a voice

 recognition dictation system.





Departure


Departure


Impression:  


   Primary Impression:  


   TIA (transient ischemic attack)


Disposition:  09 ADMITTED AS INPATIENT


Admitting Physician:  HIMS


Condition:  GOOD


Referrals:  


LEÓN AMBROCIO MD (PCP)











CHILO WILKERSON DO           May 31, 2020 16:43

## 2020-05-31 NOTE — RAD
CT HEAD WO CONTRAST

 

Clinical indications: Reason: neuro deficit, left side numbness since 

yesterday.

 

COMPARISON: November 4, 2019.

 

 

Technique: Noncontrast axial cross sectional scanning of the head was 

performed. 

 

PQRS compliance Statement

 

One or more of the following individualized dose reduction techniques were

utilized for this study:

1.  Automated exposure control

2.  Adjustment of the mA and/or kV according to patient size

3.  Use of iterative reconstruction technique

 

 

Findings: Again seen is a calcified meningioma of the paramidline of the 

right frontal lobe anteriorly. This measures about 3 cm and is unchanged. 

There is a another extradural round calcification of the posterior left 

parietal region which measures less than a centimeter. This could 

represent a small meningioma or focal cortical thickening of the inner 

table of the skull. This is stable. No acute intracranial hemorrhage or 

midline shift or mass-effect or hydrocephalus or extra-axial fluid 

collection is seen. There is encephalomalacia adjacent to the right 

frontal meningioma. This is unchanged. There is moderate bilateral 

periventricular white matter hypodensity consistent with chronic small 

vessel ischemic disease in this age group. This is stable. There is 

chronic small vessel ischemic disease of the anterior limb of the left 

internal capsule. This is unchanged. No new focal hypodense area or sulci 

effacement is seen to indicate an acute infarct or edema radiographically.

No skull fracture or pneumocephalus is seen. No opacification of the 

mastoid sinuses or the middle ear cavities or the paranasal sinuses is 

seen. The maxillary sinuses are not completely seen in this study.

 

IMPRESSION: No new intracranial abnormality is seen. Stable study.

 

Stable right frontal partially calcified meningioma. Small subcentimeter 

meningioma versus focal cortical thickening of the inner table of the 

skull of the posterior left parietal region which is stable.

 

Stable chronic small vessel ischemic disease.

 

On the previous study, a small cutaneous nodule was seen in the left 

frontal area. This is again evident and is unchanged.

 

Electronically signed by: Giancarlo Peterson MD (5/31/2020 5:22 PM) [id number]

## 2020-06-01 NOTE — RAD
BRAIN W/O CONTRAST

 

History:Reason: TIA, left weakness,

 

Technique: Multiplanar, multi sequential MR imaging was performed of the 

brain without contrast.

 

Comparison: CT May 31, 2020. MRI February 27, 2016

 

Findings:

Acute infarct within the right centrum semiovale. No additional infarct. 

No intracranial hemorrhage. No hydrocephalus.

 

Chronic small basal ganglia and left thalamic lacunar infarcts. Partially 

empty sella, often incidental.

 

Moderate focal and confluent foci of FLAIR hyperintensity within the 

hemispheric white matter and andrzej, most often due to chronic microvascular

ischemia. Right anterior parafalcine extra-axial mass partially calcified 

measures 2.3 x 2.3 cm, unchanged.

 

Punctate gradient hypointensity within the right medial frontal lobe, 

likely related to prior microhemorrhage.

 

Imaged orbits are unremarkable. Imaged paranasal sinuses and mastoid air 

cells are clear.

 

Impression:

1.  Acute small right frontal infarct.

2.  Unchanged right anterior parasagittal extra-axial mass, favor 

meningioma.

3.  Moderate sequelae of chronic microvascular ischemia.

 

Electronically signed by: Johnathan Chapman DO (6/1/2020 1:17 PM) [id number]

## 2020-06-01 NOTE — NUR
SS following for discharge planning. SS reviewed pt chart and discussed with pt RN. Pt is 
from home with family and is currently requiring oxygen. PT recommended home independent. SS 
will continue to follow for discharge planning.

## 2020-06-01 NOTE — CARD
MR#: [id number]

Account#: [id number]

Accession#: [id number]

Date of Study: 06/01/2020

Ordering Physician: ANDI BURNETT, 

Referring Physician: ANDI BURNETT, 

Tech: Vicky Goodman





--------------- APPROVED REPORT --------------





EXAM: Two-dimensional and M-mode echocardiogram with Doppler and color Doppler.



Other Information 

Quality : AverageHR: 55bpm



INDICATION

COPD  

CVA/TIA 



RISK FACTORS

Hypertension 

Hyperlipidemia

Diabetes



2D DIMENSIONS 

RVDd2.8 (2.9-3.5cm)Left Atrium(2D)3.6 (1.6-4.0cm)

IVSd0.9 (0.7-1.1cm)Aortic Root(2D)2.6 (2.0-3.7cm)

LVDd4.5 (3.9-5.9cm)LVOT Diameter2.1 (1.8-2.4cm)

PWd1.0 (0.7-1.1cm)LVDs2.6 (2.5-4.0cm)

FS (%) 42.2 %SV66.3 ml

LVEF(%)73.4 (>50%)



Aortic Valve

AoV Peak Brian.122.0cm/sAoV VTI26.5cm

AO Peak GR.6.0mmHgLVOT Peak Brian.76.4cm/s

LVOT  VTI 15.26cmAO Mean GR.3mmHg

[place] (VMAX)1.12vx4JTT   (VTI)1.99cm2

AI P 1/2 Dyrl787mb



Mitral Valve

MV E Uqysxjka94.2cm/sMV DECEL GAHC797yj

MV A Uytuzmwp48.6cm/sMV QOQ23yc

E/A  Ratio1.9MVA (PHT)4.19cm2



TDI

E/Lateral E'10.6E/Medial E'15.1



Pulmonary Valve

PV Peak Oplrqmty068.0cm/sPV Peak Grad.5mmHg



Tricuspid Valve

TR P. Pkjsexfg979wn/sRAP FMKKXKLA5lnTm

TR Peak Gr.35vhAdTWXH98feVq



Pulmonary Vein

S1 Rrigusij50.5cm/sD2 Igaaweoe60.9cm/s

PVa [id number]



 LEFT VENTRICLE 

The left ventricle is normal size. There is normal left ventricular wall thickness. The left ventricu
lar systolic function is normal. The ejection fraction is 55-60%. There is normal LV segmental wall m
otion.



 RIGHT VENTRICLE 

The right ventricle is normal size. The right ventricular systolic function is normal.



 ATRIA 

The left atrium is mildly dilated. The right atrium is mildly dilated. The interatrial septum is inta
ct with no evidence for an atrial septal defect or patent foramen ovale as noted on 2-D or Doppler im
aging.



 AORTIC VALVE 

The aortic valve is thickened but opens well. Doppler and Color Flow revealed mild aortic regurgitati
on. There is no significant aortic valvular stenosis. 



 MITRAL VALVE 

The mitral valve is thickened but opens well. There is no evidence of mitral valve prolapse. There is
 no mitral valve stenosis. Doppler and Color-flow revealed trace mitral regurgitation.



 TRICUSPID VALVE 

The tricuspid valve is normal in structure and function. Doppler and Color Flow revealed mild tricusp
id regurgitation with an estimated PAP of 48 mmHg. There is no tricuspid valve stenosis.



 PULMONIC VALVE 

The pulmonary valve is normal in structure and function. Doppler and Color Flow revealed trace to mil
d pulmonic valvular regurgitation.



 GREAT VESSELS 

The aortic root is normal in size. The ascending aorta is normal in size. The IVC is dilated and yasmani
apses >50% with inspiration.



 PERICARDIAL EFFUSION 

There is no evidence of significant pericardial effusion.



Critical Notification

Critical Value: No



<Conclusion>

The left ventricular systolic function is normal.

The ejection fraction is 55-60%.

There is normal LV segmental wall motion.

Mild aortic regurgitation.

Trace mitral regurgitation.

Mild tricuspid regurgitation with an estimated PAP of 48 mmHg.

There is no evidence of significant pericardial effusion.



Signed by : Donovan Lira, 

Electronically Approved : 06/01/2020 12:34:30

## 2020-06-01 NOTE — PDOC2
NEUROLOGY CONSULT


Date of Admission


Date of Admission


DATE: 6/1/20 


TIME: 10:02





Reason for Consult


Reason for Consult:


Transient ischemic attack





Referring Physician


Referring Physician:


Dr. Lindsey





Source


Source:  Caregiver (Daughter-in-law), Chart review, Patient





History of Present Illness


History of Present Illness


The patient is a 74-year-old right-handed female who presented to the emergency 

department yesterday  with weakness and numbness in the left arm and leg. 

Symptoms started about 2:30. NIHSS was 2. Patient has history of hypertension 

and diabetes but has never had a stroke, seizure, or head injury. She is feeling

better today. Neurology has seen in the past for right frontal meningioma and 

dizziness.





Past Medical History


Cardiovascular:  CAD, HTN, Hyperlipidemia


Pulmonary:  COPD (on home oxygen)


Endocrine:  Diabetes





Past Surgical History


Past Surgical History:  CABG, Other (coronary stents)





Family History


Family History:  No pertinent hx (unknown)





Social History


Social History


Lives with family, no alcohol or tobacco





Current Medications


Current Medications





Current Medications


Insulin Human Lispro (HumaLOG) 0-7 UNITS TIDWMEALS SQ ;  Start 6/1/20 at 08:00


Dextrose (Dextrose 50%-Water Syringe) 12.5 gm PRN Q15MIN  PRN IV SEE COMMENTS;  

Start 5/31/20 at 20:00


Albuterol Sulfate (Ventolin Neb Soln) 2.5 mg PRN Q4HRS  PRN INH SHORTNESS OF 

BREATH;  Start 5/31/20 at 22:00


Amlodipine Besylate (Norvasc) 5 mg DAILY PO  Last administered on 6/1/20at 

08:33;  Start 6/1/20 at 09:00


Aspirin (Ecotrin) 81 mg DAILYWBKFT PO  Last administered on 6/1/20at 08:32;  

Start 6/1/20 at 08:00;  Stop 6/1/20 at 08:51;  Status DC


Atorvastatin Calcium (Lipitor) 40 mg HS PO  Last administered on 5/31/20at 

22:33;  Start 5/31/20 at 22:30


Gabapentin (Neurontin) 100 mg PRN Q8HRS  PRN PO PAIN;  Start 5/31/20 at 22:00


Metoprolol Tartrate (Lopressor) 25 mg BID PO  Last administered on 6/1/20at 08:3

3;  Start 5/31/20 at 22:30


Cetirizine HCl (ZyrTEC) 10 mg DAILY PO  Last administered on 6/1/20at 08:33;  

Start 6/1/20 at 09:00


Linagliptin (Tradjenta) 5 mg DAILY PO  Last administered on 6/1/20at 08:33;  

Start 6/1/20 at 09:00


Non-Formulary Medication (Tiotropium Bromide (Spiriva)) 1 cap DAILY IH ;  Start 

6/1/20 at 09:00;  Status UNV


Ondansetron HCl (Zofran) 4 mg PRN Q6HRS  PRN IVP NAUSEA/VOMITING;  Start 5/31/20

at 22:00


Acetaminophen (Tylenol) 650 mg PRN Q6HRS  PRN PO MILD PAIN / TEMP > 100.3'F;  

Start 5/31/20 at 22:00;  Stop 6/1/20 at 08:51;  Status DC


Heparin Sodium (Porcine) (Heparin Sodium) 5,000 unit Q8HRS SQ  Last administered

on 6/1/20at 05:22;  Start 5/31/20 at 22:00


Albuterol/ Ipratropium (Duoneb) 3 ml RTQID NEB  Last administered on 6/1/20at 

07:32;  Start 6/1/20 at 08:00


Acetaminophen (Tylenol) 650 mg PRN Q6HRS  PRN PO TEMP > 100.4F;  Start 6/1/20 at

09:00


Acetaminophen (Tylenol Supp) 650 mg PRN Q4HRS  PRN SC TEMP > 100.4F;  Start 

6/1/20 at 09:00


Aspirin (Ecotrin) 325 mg DAILYWBKFT PO ;  Start 6/2/20 at 08:00


Aspirin (Aspirin Rectal Supp) 300 mg PRN DAILY  PRN SC IF UNABLE TO TAKE PO;  

Start 6/1/20 at 09:00





Active Scripts


Active


Doxycycline Hyclate 100 Mg Tablet 1 Tab PO BID


Prednisone ** (Prednisone) 10 Mg Tablet 10 Mg PO UD


     Take 5 tablets by mouth daily for 2 days, then


     take 4 tablets by mouth daily for 2 days, then


     take 3 tablets by mouth daily for 2 days, then


     take 2 tablets by mouth daily for 2 days, then


     take 1 tablets by mouth daily for 2 days, then stop.


Tessalon Perle (Benzonatate) 100 Mg Capsule 1 Cap PO TID


Spiriva (Tiotropium [place]) 18 Mcg Cap.w.dev 1 Cap IH DAILY


Claritin (Loratadine) 10 Mg Tablet 1 Tab PO DAILY


Losartan Potassium 50 Mg Tablet 50 Mg PO DAILY 30 Days


Atorvastatin Calcium 40 Mg Tablet 1 Tab PO DAILY


Reported


Norvasc (Amlodipine Besylate) 5 Mg Tablet 5 Mg PO DAILY


Proair Hfa (Albuterol Sulfate) 8.5 Gm Hfa.aer.ad 1 Puff INH QID PRN


Januvia (Sitagliptin Phosphate) 50 Mg Tablet 1 Tab PO DAILY


Metoprolol Tartrate 25 Mg Tablet 25 Mg PO BID


Aspir 81 (Aspirin) 81 Mg Tablet.dr 1 Tab PO DAILY


Glipizide Er (Glipizide) 2.5 Mg Tab.er.24 4 Tab PO DAILY


     Gave this morning


     Take again tomorrow morning


Gabapentin ** (Gabapentin) 100 Mg Capsule 100 Mg PO PRN Q8HRS PRN


     Not given on this admission


     Take as previously directed


Metformin Hcl Er (Metformin Hcl) 500 Mg Tab.er.24h 2 Tab PO BID


     Gave this morning


     Take again tonight





Allergies


Allergies:  


Coded Allergies:  


     No Known Drug Allergies (Unverified , 2/26/16)





ROS


Review of System


Negative for fever, chills, weight loss, shortness of breath, chest pain, 

indigestion, hematochezia, melena, and dysuria.  Full 14-point review of systems

is negative.





Physical Exam


Physical Examination


General:


Well-developed, well-nourished  female in no acute distress


HEENT:


Normocephalic andatraumatic. Temporal arteriespulsatile and nontender.


Neck:


Supple without bruit, no meningismus


Musculoskeletal:


Stability:see neurologic. Gait exam:see neurologic. Tone:see neuro

logic.Strength:see neurologic.


Neurological:


Mental Status:intact, orientation, memory, attention span/concentration, langua

ge, fund of knowledge normal. Cranial Nerves:Pupils equal and reactive to 

light, extraocular movements areintact, visual fields are full to 

confrontation. Facial sensation is normal. There is no facial asymmetry. 

Vestibulo-ocular reflex is intact. Palate elevates and tongue protrudes in 

midline. All other cranial related problems are negative except as mentioned 

before.Reflexes:2+ and symmetric with flexor plantar responses. Motor:5/5 

strength with normal tone and bulk. Coordination:Finger-nose finger and 

heel-to-shin testing are normal. Rapid alternating movements and fine finger 

movements are intact. Gait: not tested. Sensory: stocking loss.





Vitals


VITALS





Vital Signs








  Date Time  Temp Pulse Resp B/P (MAP) Pulse Ox O2 Delivery O2 Flow Rate FiO2


 


6/1/20 08:33  60  153/70    


 


6/1/20 08:00      Nasal Cannula 1.0 


 


6/1/20 07:32     98   


 


6/1/20 07:00 97.5  15     





 97.5       











Labs


Labs





Laboratory Tests








Test


 5/31/20


16:44 5/31/20


20:20 6/1/20


07:51


 


White Blood Count


 6.9 x10^3/uL


(4.0-11.0) 


 





 


Red Blood Count


 4.92 x10^6/uL


(3.50-5.40) 


 





 


Hemoglobin


 14.0 g/dL


(12.0-15.5) 


 





 


Hematocrit


 41.7 %


(36.0-47.0) 


 





 


Mean Corpuscular Volume 85 fL ()   


 


Mean Corpuscular Hemoglobin 29 pg (25-35)   


 


Mean Corpuscular Hemoglobin


Concent 34 g/dL


(31-37) 


 





 


Red Cell Distribution Width


 14.8 %


(11.5-14.5) 


 





 


Platelet Count


 122 x10^3/uL


(140-400) 


 





 


Neutrophils (%) (Auto) 58 % (31-73)   


 


Lymphocytes (%) (Auto) 31 % (24-48)   


 


Monocytes (%) (Auto) 8 % (0-9)   


 


Eosinophils (%) (Auto) 3 % (0-3)   


 


Basophils (%) (Auto) 1 % (0-3)   


 


Neutrophils # (Auto)


 4.0 x10^3/uL


(1.8-7.7) 


 





 


Lymphocytes # (Auto)


 2.1 x10^3/uL


(1.0-4.8) 


 





 


Monocytes # (Auto)


 0.5 x10^3/uL


(0.0-1.1) 


 





 


Eosinophils # (Auto)


 0.2 x10^3/uL


(0.0-0.7) 


 





 


Basophils # (Auto)


 0.1 x10^3/uL


(0.0-0.2) 


 





 


Prothrombin Time


 12.7 SEC


(11.7-14.0) 


 





 


Prothromb Time International


Ratio 1.0 (0.8-1.1) 


 


 





 


Sodium Level


 145 mmol/L


(136-145) 


 





 


Potassium Level


 4.3 mmol/L


(3.5-5.1) 


 





 


Chloride Level


 106 mmol/L


() 


 





 


Carbon Dioxide Level


 31 mmol/L


(21-32) 


 





 


Anion Gap 8 (6-14)   


 


Blood Urea Nitrogen


 35 mg/dL


(7-20) 


 





 


Creatinine


 1.6 mg/dL


(0.6-1.0) 


 





 


Estimated GFR


(Cockcroft-Gault) 31.5 


 


 





 


BUN/Creatinine Ratio 22 (6-20)   


 


Glucose Level


 101 mg/dL


(70-99) 


 





 


Calcium Level


 9.0 mg/dL


(8.5-10.1) 


 





 


Total Bilirubin


 1.0 mg/dL


(0.2-1.0) 


 





 


Aspartate Amino Transf


(AST/SGOT) 25 U/L (15-37) 


 


 





 


Alanine Aminotransferase


(ALT/SGPT) 32 U/L (14-59) 


 


 





 


Alkaline Phosphatase


 64 U/L


() 


 





 


Troponin I Quantitative


 < 0.017 ng/mL


(0.000-0.055) 


 





 


Total Protein


 7.6 g/dL


(6.4-8.2) 


 





 


Albumin


 3.8 g/dL


(3.4-5.0) 


 





 


Albumin/Globulin Ratio 1.0 (1.0-1.7)   


 


Glucose (Fingerstick)


 


 80 mg/dL


(70-99) 85 mg/dL


(70-99)








Laboratory Tests








Test


 5/31/20


16:44 5/31/20


20:20 6/1/20


07:51


 


White Blood Count


 6.9 x10^3/uL


(4.0-11.0) 


 





 


Red Blood Count


 4.92 x10^6/uL


(3.50-5.40) 


 





 


Hemoglobin


 14.0 g/dL


(12.0-15.5) 


 





 


Hematocrit


 41.7 %


(36.0-47.0) 


 





 


Mean Corpuscular Volume 85 fL ()   


 


Mean Corpuscular Hemoglobin 29 pg (25-35)   


 


Mean Corpuscular Hemoglobin


Concent 34 g/dL


(31-37) 


 





 


Red Cell Distribution Width


 14.8 %


(11.5-14.5) 


 





 


Platelet Count


 122 x10^3/uL


(140-400) 


 





 


Neutrophils (%) (Auto) 58 % (31-73)   


 


Lymphocytes (%) (Auto) 31 % (24-48)   


 


Monocytes (%) (Auto) 8 % (0-9)   


 


Eosinophils (%) (Auto) 3 % (0-3)   


 


Basophils (%) (Auto) 1 % (0-3)   


 


Neutrophils # (Auto)


 4.0 x10^3/uL


(1.8-7.7) 


 





 


Lymphocytes # (Auto)


 2.1 x10^3/uL


(1.0-4.8) 


 





 


Monocytes # (Auto)


 0.5 x10^3/uL


(0.0-1.1) 


 





 


Eosinophils # (Auto)


 0.2 x10^3/uL


(0.0-0.7) 


 





 


Basophils # (Auto)


 0.1 x10^3/uL


(0.0-0.2) 


 





 


Prothrombin Time


 12.7 SEC


(11.7-14.0) 


 





 


Prothromb Time International


Ratio 1.0 (0.8-1.1) 


 


 





 


Sodium Level


 145 mmol/L


(136-145) 


 





 


Potassium Level


 4.3 mmol/L


(3.5-5.1) 


 





 


Chloride Level


 106 mmol/L


() 


 





 


Carbon Dioxide Level


 31 mmol/L


(21-32) 


 





 


Anion Gap 8 (6-14)   


 


Blood Urea Nitrogen


 35 mg/dL


(7-20) 


 





 


Creatinine


 1.6 mg/dL


(0.6-1.0) 


 





 


Estimated GFR


(Cockcroft-Gault) 31.5 


 


 





 


BUN/Creatinine Ratio 22 (6-20)   


 


Glucose Level


 101 mg/dL


(70-99) 


 





 


Calcium Level


 9.0 mg/dL


(8.5-10.1) 


 





 


Total Bilirubin


 1.0 mg/dL


(0.2-1.0) 


 





 


Aspartate Amino Transf


(AST/SGOT) 25 U/L (15-37) 


 


 





 


Alanine Aminotransferase


(ALT/SGPT) 32 U/L (14-59) 


 


 





 


Alkaline Phosphatase


 64 U/L


() 


 





 


Troponin I Quantitative


 < 0.017 ng/mL


(0.000-0.055) 


 





 


Total Protein


 7.6 g/dL


(6.4-8.2) 


 





 


Albumin


 3.8 g/dL


(3.4-5.0) 


 





 


Albumin/Globulin Ratio 1.0 (1.0-1.7)   


 


Glucose (Fingerstick)


 


 80 mg/dL


(70-99) 85 mg/dL


(70-99)











Images


Images


CT HEAD WO CONTRAST


 


Clinical indications: Reason: neuro deficit, left side numbness since 


yesterday.


 


COMPARISON: November 4, 2019.


 


 


Technique: Noncontrast axial cross sectional scanning of the head was 


performed. 


 


PQRS compliance Statement


 


One or more of the following individualized dose reduction techniques were


utilized for this study:


1.  Automated exposure control


2.  Adjustment of the mA and/or kV according to patient size


3.  Use of iterative reconstruction technique


 


 


Findings: Again seen is a calcified meningioma of the paramidline of the 


right frontal lobe anteriorly. This measures about 3 cm and is unchanged. 


There is a another extradural round calcification of the posterior left 


parietal region which measures less than a centimeter. This could 


represent a small meningioma or focal cortical thickening of the inner 


table of the skull. This is stable. No acute intracranial hemorrhage or 


midline shift or mass-effect or hydrocephalus or extra-axial fluid 


collection is seen. There is encephalomalacia adjacent to the right 


frontal meningioma. This is unchanged. There is moderate bilateral 


periventricular white matter hypodensity consistent with chronic small 


vessel ischemic disease in this age group. This is stable. There is 


chronic small vessel ischemic disease of the anterior limb of the left 


internal capsule. This is unchanged. No new focal hypodense area or sulci 


effacement is seen to indicate an acute infarct or edema radiographically.


No skull fracture or pneumocephalus is seen. No opacification of the 


mastoid sinuses or the middle ear cavities or the paranasal sinuses is 


seen. The maxillary sinuses are not completely seen in this study.


 


IMPRESSION: No new intracranial abnormality is seen. Stable study.


 


Stable right frontal partially calcified meningioma. Small subcentimeter 


meningioma versus focal cortical thickening of the inner table of the 


skull of the posterior left parietal region which is stable.


 


Stable chronic small vessel ischemic disease.


 


On the previous study, a small cutaneous nodule was seen in the left 


frontal area. This is again evident and is unchanged.





Assessment/Plan


Assessment/Plan


Impression:


Possible transient ischemic attack, non-focal exam now


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


Diabetic neuropathy


Hypertension, diabetes, hyperlipidemia





Recommendations:


MRI of the brain


Carotid Doppler studies


Echocardiogram


Aspirin


Statin


Rehabilitation screening


Aim for discharge later today if tests negative.


Discussed with patients daughter-in-law





Thank you for letting me help with the patient's care.











ANDI BURNETT MD            Jun 1, 2020 10:11

## 2020-06-01 NOTE — EKG
[facility]

              [address] [place] [address]

Test Date:    2020               Test Time:    18:06:54

Pat Name:     GINA JETT                 Department:   

Patient ID:   [id number]           Room:         3 

Gender:       F                        Technician:   

:          1945               Requested By: CHLIO WILKERSON

Order Number: [id number]           Reading MD:   Brayan Harrington MD

                                 Measurements

Intervals                              Axis          

Rate:         63                       P:            90

IN:           158                      QRS:          1

QRSD:         84                       T:            58

QT:           438                                    

QTc:          452                                    

                           Interpretive Statements

SINUS RHYTHM

NON-SPECIFIC ST/T CHANGES

Electronically Signed On 2020 12:24:55 CDT by Brayan Harrington MD

## 2020-06-01 NOTE — RAD
DOPPLER CAROTID BILAT

 

History: Reason: TIA, left paresis; / Spl. Instructions:  / History: 

 

COMPARISON: None

 

Technique: Duplex sonography of the cervical portion of both carotid 

arteries was performed. Real-time grayscale, color flow Doppler, and 

Doppler spectral waveform analysis is performed.

 

PQRS Compliance Statement - Stenosis calculations for CT, MR and 

conventional angiography are based upon measurement of the distal ICA 

diameter in accordance with the NASCET methodology.  Stenosis calculations

for carotid ultrasound studies are derived from validated velocity 

criteria which are known to correlate with the NASCET methodology.

 

Findings:

Right side:

Peak systolic flow velocity of the CCA is 80 cm/sec.

Peak systolic flow velocity of the ICA is 123 cm/sec.

The ICA/CCA ratio is 6.8.

Peak end diastolic flow velocity of the ICA is 35 cm/sec.

The peak systolic velocity of the ECA is 51 cm/sec.

 

Moderate scattered atheromatous plaque most prominent within the carotid 

bifurcation. Enlarged right common carotid artery compared to the left.

 

Left side:

Peak systolic flow velocity of the CCA is 46 cm/sec.

Peak systolic flow velocity of the ICA is 87 cm/sec.

The ICA/CCA ratio is 1.9.

Peak end diastolic flow velocity of the ICA is 23 cm/sec. 

Peak systolic flow velocity of the ECA is 41 cm/sec.

 

Moderate scattered atheromatous plaque most prominent within the carotid 

bifurcation.

 

Vertebral arteries: Bilateral vertebral arteries demonstrate antegrade 

flow.

 

IMPRESSION:

1.  Elevated right internal carotid to common carotid artery velocity 

ratio, may relate to increased size of the common carotid artery 

contributing to decreased velocity. No luminal stenosis seen on grayscale 

imaging. No velocity elevation to suggest stenosis.

2.  Moderate bilateral atheromatous plaque.

 

Electronically signed by: Johnathan Chapman DO (6/1/2020 12:03 PM) [id number]

## 2020-06-01 NOTE — PDOC1
History and Physical


Date of Admission


Date of Admission


6/1/2020





Identification/Chief Complaint


Chief Complaint


my arm feels numb





History of Present Illness


History of Present Illness


Patient is a 74 year old female who has history of diabetes mellitus type 2 and 

essential hypertension who was in her usual state of health until Saturday 2 

days priro to her admission when she related to the rduabusterter in law she felt 

left arm numbness. Patient symptoms resolved but recurred on Sunday afternoon 

and she expressed having the sensation of a stroke. She has not suffered strokes

in the past but due to her statement she was brought for evaluation to the ER N

eurologically the patient seem to be intact SYDNEY score was 2 upon eval in the ER.

SHe has had [place] palsy more or less 20 years ago according to her daughter in 

law. She denies headache no blurred vision no dysphnagia no odynophagia no 

facial droop no hemiparesis reported. She denies chest pain palpitation or 

shortness of breath. 


No abdominal pain no nausea vomiting or diarrhea reported no urinary symptoms. 


She will be admitted for further evaluation and neurology consultation .





Past Medical History


Cardiovascular:  CAD, HTN, Hyperlipidemia


Pulmonary:  COPD (on home oxygen)


CENTRAL NERVOUS SYSTEM:  Periperal neuropathy, Vertigo, Other


GI:  No pertinent hx


Infectious disease:  No pertinent hx


Renal/:  Chronic renal insuff


Endocrine:  Diabetes





Past Surgical History


Past Surgical History:  CABG, Other (coronary stents)





Family History


Family History:  Other (family history reviewed and found non contributory to 

the present. )





Social History


ALCOHOL:  none


Drugs:  None





Current Problem List


Problem List


Problems


Medical Problems:


(1) TIA (transient ischemic attack)


Status: Acute  











Current Medications


Current Medications





Current Medications








 Medications


  (Trade)  Dose


 Ordered  Sig/Kenroy  Start Time


 Stop Time Status Last Admin


Dose Admin


 


 Acetaminophen


  (Tylenol Supp)  650 mg  PRN Q4HRS  PRN  6/1/20 09:00


     





 


 Acetaminophen


  (Tylenol)  650 mg  PRN Q6HRS  PRN  6/1/20 09:00


     





 


 Albuterol Sulfate


  (Ventolin Neb


 Soln)  2.5 mg  PRN Q4HRS  PRN  5/31/20 22:00


     





 


 Albuterol/


 Ipratropium


  (Duoneb)  3 ml  RTQID  6/1/20 08:00


    6/1/20 16:06


3 ML


 


 Amlodipine


 Besylate


  (Norvasc)  5 mg  DAILY  6/1/20 09:00


    6/1/20 08:33


5 MG


 


 Aspirin


  (Aspirin Rectal


 Supp)  300 mg  PRN DAILY  PRN  6/1/20 09:00


     





 


 Aspirin


  (Ecotrin)  325 mg  DAILYWBKFT  6/2/20 08:00


     





 


 Atorvastatin


 Calcium


  (Lipitor)  40 mg  HS  5/31/20 22:30


    5/31/20 22:33


40 MG


 


 Cetirizine HCl


  (ZyrTEC)  10 mg  DAILY  6/1/20 09:00


    6/1/20 08:33


10 MG


 


 Dextrose


  (Dextrose


 50%-Water Syringe)  12.5 gm  PRN Q15MIN  PRN  5/31/20 20:00


     





 


 Gabapentin


  (Neurontin)  100 mg  PRN Q8HRS  PRN  5/31/20 22:00


     





 


 Heparin Sodium


  (Porcine)


  (Heparin Sodium)  5,000 unit  Q8HRS  5/31/20 22:00


    6/1/20 14:46


5,000 UNIT


 


 Insulin Human


 Lispro


  (HumaLOG)  0-7 UNITS  TIDWMEALS  6/1/20 08:00


    6/1/20 12:18


3 UNITS


 


 Linagliptin


  (Tradjenta)  5 mg  DAILY  6/1/20 09:00


    6/1/20 08:33


5 MG


 


 Metoprolol


 Tartrate


  (Lopressor)  25 mg  BID  5/31/20 22:30


    6/1/20 08:33


25 MG


 


 Non-Formulary


 Medication


  (Tiotropium


 Bromide (Spiriva))  1 cap  DAILY  6/1/20 09:00


   UNV  





 


 Ondansetron HCl


  (Zofran)  4 mg  PRN Q6HRS  PRN  5/31/20 22:00


     














Allergies


Allergies





Allergies








Coded Allergies Type Severity Reaction Last Updated Verified


 


  No Known Drug Allergies    2/26/16 No











ROS


Review of System


CONSTITUTIONAL:        No fever or chills


EYES:                          No recent changes


SKIN:               No rash or itching


CARDIOVASCULAR:     No chest pain, syncope, palpitations, or edema


RESPIRATORY:            No SOB or cough


GASTROINTESTINAL:    No nausea, vomiting or abdominal pain


NEUROLOGICAL:          No headaches or weakness


ENDOCRINE:               No cold or heat intolerance


GENITOURINARY:         No urgency or frequency of urination


MUSCULOSKELETAL:   No back pain or joint pain


LYMPHATICS:               No enlarged lymph nodes


PSYCHIATRIC:              No anxiety or depression





Physical Exam


Physical Exam


GEN.:    No apparent distress.  Alert and oriented.


HEENT:    Head is normocephalic, atraumatic


NECK:    Supple.  


LUNGS:    Clear to auscultation.


HEART:    RRR, S1, S2 present.  Peripheral pulses intact


ABDOMEN:    Soft, nontender.  Positive bowel sounds.


EXTREMITIES:    Without any cyanosis.    


NEUROLOGIC:     Normal speech, normal tone


PSYCHIATRIC:    Normal affect, normal mood.


SKIN:   No ulcerations





Vitals


Vitals





Vital Signs








  Date Time  Temp Pulse Resp B/P (MAP) Pulse Ox O2 Delivery O2 Flow Rate FiO2


 


6/1/20 16:06      Room Air  


 


6/1/20 15:00 98.5 64 18 100/52 (68) 95   





 98.5       


 


6/1/20 08:00       1.0 











Labs


Labs





Laboratory Tests








Test


 5/31/20


16:44 5/31/20


20:20 6/1/20


07:51 6/1/20


12:02


 


White Blood Count


 6.9 x10^3/uL


(4.0-11.0) 


 


 





 


Red Blood Count


 4.92 x10^6/uL


(3.50-5.40) 


 


 





 


Hemoglobin


 14.0 g/dL


(12.0-15.5) 


 


 





 


Hematocrit


 41.7 %


(36.0-47.0) 


 


 





 


Mean Corpuscular Volume 85 fL ()    


 


Mean Corpuscular Hemoglobin 29 pg (25-35)    


 


Mean Corpuscular Hemoglobin


Concent 34 g/dL


(31-37) 


 


 





 


Red Cell Distribution Width


 14.8 %


(11.5-14.5) 


 


 





 


Platelet Count


 122 x10^3/uL


(140-400) 


 


 





 


Neutrophils (%) (Auto) 58 % (31-73)    


 


Lymphocytes (%) (Auto) 31 % (24-48)    


 


Monocytes (%) (Auto) 8 % (0-9)    


 


Eosinophils (%) (Auto) 3 % (0-3)    


 


Basophils (%) (Auto) 1 % (0-3)    


 


Neutrophils # (Auto)


 4.0 x10^3/uL


(1.8-7.7) 


 


 





 


Lymphocytes # (Auto)


 2.1 x10^3/uL


(1.0-4.8) 


 


 





 


Monocytes # (Auto)


 0.5 x10^3/uL


(0.0-1.1) 


 


 





 


Eosinophils # (Auto)


 0.2 x10^3/uL


(0.0-0.7) 


 


 





 


Basophils # (Auto)


 0.1 x10^3/uL


(0.0-0.2) 


 


 





 


Prothrombin Time


 12.7 SEC


(11.7-14.0) 


 


 





 


Prothromb Time International


Ratio 1.0 (0.8-1.1) 


 


 


 





 


Sodium Level


 145 mmol/L


(136-145) 


 


 





 


Potassium Level


 4.3 mmol/L


(3.5-5.1) 


 


 





 


Chloride Level


 106 mmol/L


() 


 


 





 


Carbon Dioxide Level


 31 mmol/L


(21-32) 


 


 





 


Anion Gap 8 (6-14)    


 


Blood Urea Nitrogen


 35 mg/dL


(7-20) 


 


 





 


Creatinine


 1.6 mg/dL


(0.6-1.0) 


 


 





 


Estimated GFR


(Cockcroft-Gault) 31.5 


 


 


 





 


BUN/Creatinine Ratio 22 (6-20)    


 


Glucose Level


 101 mg/dL


(70-99) 


 


 





 


Calcium Level


 9.0 mg/dL


(8.5-10.1) 


 


 





 


Total Bilirubin


 1.0 mg/dL


(0.2-1.0) 


 


 





 


Aspartate Amino Transf


(AST/SGOT) 25 U/L (15-37) 


 


 


 





 


Alanine Aminotransferase


(ALT/SGPT) 32 U/L (14-59) 


 


 


 





 


Alkaline Phosphatase


 64 U/L


() 


 


 





 


Troponin I Quantitative


 < 0.017 ng/mL


(0.000-0.055) 


 


 





 


Total Protein


 7.6 g/dL


(6.4-8.2) 


 


 





 


Albumin


 3.8 g/dL


(3.4-5.0) 


 


 





 


Albumin/Globulin Ratio 1.0 (1.0-1.7)    


 


Triglycerides Level


 242 mg/dL


(0-150) 


 


 





 


Cholesterol Level


 131 mg/dL


(0-200) 


 


 





 


LDL Cholesterol, Calculated


 46 mg/dL


(0-100) 


 


 





 


VLDL Cholesterol, Calculated


 48 mg/dL


(0-40) 


 


 





 


Non-HDL Cholesterol Calculated


 94 mg/dL


(0-129) 


 


 





 


HDL Cholesterol


 37 mg/dL


(40-60) 


 


 





 


Cholesterol/HDL Ratio 3.5    


 


Glucose (Fingerstick)


 


 80 mg/dL


(70-99) 85 mg/dL


(70-99) 178 mg/dL


(70-99)








Laboratory Tests








Test


 5/31/20


16:44 5/31/20


20:20 6/1/20


07:51 6/1/20


12:02


 


White Blood Count


 6.9 x10^3/uL


(4.0-11.0) 


 


 





 


Red Blood Count


 4.92 x10^6/uL


(3.50-5.40) 


 


 





 


Hemoglobin


 14.0 g/dL


(12.0-15.5) 


 


 





 


Hematocrit


 41.7 %


(36.0-47.0) 


 


 





 


Mean Corpuscular Volume 85 fL ()    


 


Mean Corpuscular Hemoglobin 29 pg (25-35)    


 


Mean Corpuscular Hemoglobin


Concent 34 g/dL


(31-37) 


 


 





 


Red Cell Distribution Width


 14.8 %


(11.5-14.5) 


 


 





 


Platelet Count


 122 x10^3/uL


(140-400) 


 


 





 


Neutrophils (%) (Auto) 58 % (31-73)    


 


Lymphocytes (%) (Auto) 31 % (24-48)    


 


Monocytes (%) (Auto) 8 % (0-9)    


 


Eosinophils (%) (Auto) 3 % (0-3)    


 


Basophils (%) (Auto) 1 % (0-3)    


 


Neutrophils # (Auto)


 4.0 x10^3/uL


(1.8-7.7) 


 


 





 


Lymphocytes # (Auto)


 2.1 x10^3/uL


(1.0-4.8) 


 


 





 


Monocytes # (Auto)


 0.5 x10^3/uL


(0.0-1.1) 


 


 





 


Eosinophils # (Auto)


 0.2 x10^3/uL


(0.0-0.7) 


 


 





 


Basophils # (Auto)


 0.1 x10^3/uL


(0.0-0.2) 


 


 





 


Prothrombin Time


 12.7 SEC


(11.7-14.0) 


 


 





 


Prothromb Time International


Ratio 1.0 (0.8-1.1) 


 


 


 





 


Sodium Level


 145 mmol/L


(136-145) 


 


 





 


Potassium Level


 4.3 mmol/L


(3.5-5.1) 


 


 





 


Chloride Level


 106 mmol/L


() 


 


 





 


Carbon Dioxide Level


 31 mmol/L


(21-32) 


 


 





 


Anion Gap 8 (6-14)    


 


Blood Urea Nitrogen


 35 mg/dL


(7-20) 


 


 





 


Creatinine


 1.6 mg/dL


(0.6-1.0) 


 


 





 


Estimated GFR


(Cockcroft-Gault) 31.5 


 


 


 





 


BUN/Creatinine Ratio 22 (6-20)    


 


Glucose Level


 101 mg/dL


(70-99) 


 


 





 


Calcium Level


 9.0 mg/dL


(8.5-10.1) 


 


 





 


Total Bilirubin


 1.0 mg/dL


(0.2-1.0) 


 


 





 


Aspartate Amino Transf


(AST/SGOT) 25 U/L (15-37) 


 


 


 





 


Alanine Aminotransferase


(ALT/SGPT) 32 U/L (14-59) 


 


 


 





 


Alkaline Phosphatase


 64 U/L


() 


 


 





 


Troponin I Quantitative


 < 0.017 ng/mL


(0.000-0.055) 


 


 





 


Total Protein


 7.6 g/dL


(6.4-8.2) 


 


 





 


Albumin


 3.8 g/dL


(3.4-5.0) 


 


 





 


Albumin/Globulin Ratio 1.0 (1.0-1.7)    


 


Triglycerides Level


 242 mg/dL


(0-150) 


 


 





 


Cholesterol Level


 131 mg/dL


(0-200) 


 


 





 


LDL Cholesterol, Calculated


 46 mg/dL


(0-100) 


 


 





 


VLDL Cholesterol, Calculated


 48 mg/dL


(0-40) 


 


 





 


Non-HDL Cholesterol Calculated


 94 mg/dL


(0-129) 


 


 





 


HDL Cholesterol


 37 mg/dL


(40-60) 


 


 





 


Cholesterol/HDL Ratio 3.5    


 


Glucose (Fingerstick)


 


 80 mg/dL


(70-99) 85 mg/dL


(70-99) 178 mg/dL


(70-99)











VTE Prophylaxis Ordered


VTE Prophylaxis Devices:  Yes


VTE Pharmacological Prophylaxi:  Yes





Assessment/Plan


Assessment/Plan


TIA suspect acute CVA


History of diabetes mellitus type 2


Essential hypertension fairly controlled. 


Obesity





Plan:


will check hba1c


will restart home meds


MRI of the brain


neuro checks


reassess in the am


will start ASA and STatin therapy


follow recs from neurology


further recommendations based on clinical course. 


DVT prophylaxis heparin





Justicifation of Admission Dx:


Justifications for Admission:


Justification of Admission Dx:  Yes


Stroke - Ischemic:  Stroke-Ischemic











CINDY WALDEN MD              Jun 1, 2020 16:28

## 2020-06-02 VITALS — SYSTOLIC BLOOD PRESSURE: 100 MMHG | DIASTOLIC BLOOD PRESSURE: 52 MMHG

## 2020-06-02 NOTE — PDOC3
Discharge Summary


Visit Information


Date of Admission:  Jun 1, 2020


Date of Discharge:  Jun 2, 2020


Admitting Diagnosis Comment:


TIA suspect acute CVA


History of diabetes mellitus type 2


Essential hypertension fairly controlled. 


Obesity


Final Diagnosis


Problems


Acute small right frontal lacunar infarct


Non-focal exam now


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


Diabetic neuropathy


Essential Hypertension, 


diabetes type 2 well controlled with hba1c of 6.7 


Dyslipidemia





Brief Hospital Course


Allergies





                                    Allergies








Coded Allergies Type Severity Reaction Last Updated Verified


 


  No Known Drug Allergies    2/26/16 No








Vital Signs





Vital Signs








  Date Time  Temp Pulse Resp B/P (MAP) Pulse Ox O2 Delivery O2 Flow Rate FiO2


 


6/2/20 11:39     94 Room Air  


 


6/2/20 10:52 97.3 63 16 100/52 (68)    





 97.3       


 


6/2/20 08:00       1.0 








Lab Results





Laboratory Tests








Test


 5/31/20


16:44 5/31/20


20:20 6/1/20


07:51 6/1/20


12:02


 


White Blood Count


 6.9 x10^3/uL


(4.0-11.0) 


 


 





 


Red Blood Count


 4.92 x10^6/uL


(3.50-5.40) 


 


 





 


Hemoglobin


 14.0 g/dL


(12.0-15.5) 


 


 





 


Hematocrit


 41.7 %


(36.0-47.0) 


 


 





 


Mean Corpuscular Volume 85 fL ()    


 


Mean Corpuscular Hemoglobin 29 pg (25-35)    


 


Mean Corpuscular Hemoglobin


Concent 34 g/dL


(31-37) 


 


 





 


Red Cell Distribution Width


 14.8 %


(11.5-14.5) 


 


 





 


Platelet Count


 122 x10^3/uL


(140-400) 


 


 





 


Neutrophils (%) (Auto) 58 % (31-73)    


 


Lymphocytes (%) (Auto) 31 % (24-48)    


 


Monocytes (%) (Auto) 8 % (0-9)    


 


Eosinophils (%) (Auto) 3 % (0-3)    


 


Basophils (%) (Auto) 1 % (0-3)    


 


Neutrophils # (Auto)


 4.0 x10^3/uL


(1.8-7.7) 


 


 





 


Lymphocytes # (Auto)


 2.1 x10^3/uL


(1.0-4.8) 


 


 





 


Monocytes # (Auto)


 0.5 x10^3/uL


(0.0-1.1) 


 


 





 


Eosinophils # (Auto)


 0.2 x10^3/uL


(0.0-0.7) 


 


 





 


Basophils # (Auto)


 0.1 x10^3/uL


(0.0-0.2) 


 


 





 


Prothrombin Time


 12.7 SEC


(11.7-14.0) 


 


 





 


Prothromb Time International


Ratio 1.0 (0.8-1.1) 


 


 


 





 


Sodium Level


 145 mmol/L


(136-145) 


 


 





 


Potassium Level


 4.3 mmol/L


(3.5-5.1) 


 


 





 


Chloride Level


 106 mmol/L


() 


 


 





 


Carbon Dioxide Level


 31 mmol/L


(21-32) 


 


 





 


Anion Gap 8 (6-14)    


 


Blood Urea Nitrogen


 35 mg/dL


(7-20) 


 


 





 


Creatinine


 1.6 mg/dL


(0.6-1.0) 


 


 





 


Estimated GFR


(Cockcroft-Gault) 31.5 


 


 


 





 


BUN/Creatinine Ratio 22 (6-20)    


 


Glucose Level


 101 mg/dL


(70-99) 


 


 





 


Hemoglobin A1c


 6.7 %


(4.8-5.6) 


 


 





 


Calcium Level


 9.0 mg/dL


(8.5-10.1) 


 


 





 


Total Bilirubin


 1.0 mg/dL


(0.2-1.0) 


 


 





 


Aspartate Amino Transf


(AST/SGOT) 25 U/L (15-37) 


 


 


 





 


Alanine Aminotransferase


(ALT/SGPT) 32 U/L (14-59) 


 


 


 





 


Alkaline Phosphatase


 64 U/L


() 


 


 





 


Troponin I Quantitative


 < 0.017 ng/mL


(0.000-0.055) 


 


 





 


Total Protein


 7.6 g/dL


(6.4-8.2) 


 


 





 


Albumin


 3.8 g/dL


(3.4-5.0) 


 


 





 


Albumin/Globulin Ratio 1.0 (1.0-1.7)    


 


Triglycerides Level


 242 mg/dL


(0-150) 


 


 





 


Cholesterol Level


 131 mg/dL


(0-200) 


 


 





 


LDL Cholesterol, Calculated


 46 mg/dL


(0-100) 


 


 





 


VLDL Cholesterol, Calculated


 48 mg/dL


(0-40) 


 


 





 


Non-HDL Cholesterol Calculated


 94 mg/dL


(0-129) 


 


 





 


HDL Cholesterol


 37 mg/dL


(40-60) 


 


 





 


Cholesterol/HDL Ratio 3.5    


 


Glucose (Fingerstick)


 


 80 mg/dL


(70-99) 85 mg/dL


(70-99) 178 mg/dL


(70-99)


 


Test


 6/1/20


16:22 6/1/20


21:12 6/2/20


07:38 6/2/20


11:20


 


Glucose (Fingerstick)


 132 mg/dL


(70-99) 259 mg/dL


(70-99) 125 mg/dL


(70-99) 272 mg/dL


(70-99)








Laboratory Tests








Test


 6/1/20


16:22 6/1/20


21:12 6/2/20


07:38 6/2/20


11:20


 


Glucose (Fingerstick)


 132 mg/dL


(70-99) 259 mg/dL


(70-99) 125 mg/dL


(70-99) 272 mg/dL


(70-99)








Brief Hospital Course


Patient is a 74 year old female who has history of diabetes mellitus type 2 and 

essential hypertension who was in her usual state of health until Saturday 2 

days priro to her admission when she related to the rduaghter in law she felt 

left arm numbness. Patient symptoms resolved but recurred on Sunday afternoon 

and she expressed having the sensation of a stroke. She has not suffered strokes

in the past but due to her statement she was brought for evaluation to the ER 

Neurologically the patient seem to be intact SYDNEY score was 2 upon eval in the 

ER. SHe has had [place] palsy more or less 20 years ago according to her daughter 

in law. She denies headache no blurred vision no dysphnagia no odynophagia no 

facial droop no hemiparesis reported. She denies chest pain palpitation or 

shortness of breath. 


No abdominal pain no nausea vomiting or diarrhea reported no urinary symptoms. 


She will be admitted for further evaluation and neurology consultation .





She was on aspirin therapy as well.  The patient was deemed appropriate from the

neurological standpoint of view she is on home oxygen as part of her comorb

idities with COPD.  No acute events reported overnight and she was in good 

spirits to be discharged home.  We encouraged her to follow-up with her primary 

care physician within 1 week





Discharge Information


Condition at Discharge:  Improved


Follow Up:  Weeks


Disposition/Orders:  D/C to Home


Scheduled


Amlodipine Besylate (Norvasc) 5 Mg Tablet, 5 MG PO DAILY for hypertension, 

(Reported)


   Entered as Reported by: ORLY JOHNSON on 11/5/19 1623


   Last Action: Continued on 5/31/20 2159 by NATALIE WATTS MD


Aspirin (Aspir 81) 81 Mg Tablet.dr, 1 TAB PO DAILY, #30 Ref 5 (Reported)


   Entered as Reported by: EARLINE DASILVA on 2/28/16 1332


   Last Action: Continued on 5/31/20 2159 by NAATLIE WATTS MD


Atorvastatin Calcium (Atorvastatin Calcium) 40 Mg Tablet, 80 MG PO HS for cva 

for 30 Days, #60


   Prescribed by: CINDY WALDEN MD on 6/2/20 1110


Benzonatate (Tessalon Perle) 100 Mg Capsule, 1 CAP PO TID for cough, #30


   Prescribed by: ILENE GALLARDO MD on 1/16/19 1443


Glipizide (Glipizide Er) 2.5 Mg Tab.er.24, 4 TAB PO DAILY for diabetes, #30 Ref 

5 (Reported)


   Gave this morning Take again tomorrow morning 


   Entered as Reported by: JEMMA JARRELL on 2/26/16 1835


Loratadine (Claritin) 10 Mg Tablet, 1 TAB PO DAILY for allergies, #30 Ref 5


   Prescribed by: ILENE GALLARDO MD on 1/15/19 1657


   Last Action: Converted on 5/31/20 2159 by NATALIE WATTS MD


Losartan Potassium (Losartan Potassium) 50 Mg Tablet, 50 MG PO DAILY for 

HYPERTENSION for 30 Days, #30


   Prescribed by: ILENE GALLARDO MD on 1/15/19 1657


Metformin Hcl (Metformin Hcl Er) 500 Mg Tab.er.24h, 2 TAB PO BID for diabetes, 

#180 Ref 3 (Reported)


   Gave this morning Take again tonight 


   Entered as Reported by: JEMMA JARRELL on 2/26/16 1831


Metoprolol Tartrate (Metoprolol Tartrate) 25 Mg Tablet, 25 MG PO BID for FOR 

HYPERTENSION, #60 Ref 0 (Reported)


   Entered as Reported by: EARLINE DASILVA on 2/28/16 1333


   Last Action: Continued on 5/31/20 2159 by NATALIE WATTS MD


Sitagliptin Phosphate (Januvia) 50 Mg Tablet, 1 TAB PO DAILY for diabetes, #30 

Ref 5 (Reported)


   Entered as Reported by: SNADI RONDON on 1/15/19 1604


   Last Action: Converted on 5/31/20 2159 by NATALIE WATTS MD


Tiotropium Bromide (Spiriva) 18 Mcg Cap.w.dev, 1 CAP IH DAILY for lungs, #30 Ref

3


   Prescribed by: ILENE GALLARDO MD on 1/15/19 1657


   Last Action: Converted on 5/31/20 2159 by NATALIE WATTS MD





Scheduled PRN


Albuterol Sulfate (Proair Hfa) 8.5 Gm Hfa.aer.ad, 1 PUFF INH QID PRN for 

SHORTNESS OF BREATH, (Reported)


   Entered as Reported by: SANDI RONDON on 1/15/19 1604


   Last Action: Continued on 5/31/20 2159 by NATALIE WATTS MD


Gabapentin (Gabapentin **) 100 Mg Capsule, 100 MG PO PRN Q8HRS PRN for PAIN, 

(Reported)


   Not given on this admission Take as previously directed 


   Entered as Reported by: JEMMA JARRELL on 2/26/16 1833


   Last Action: Continued on 5/31/20 2159 by NATALIE WATTS MD





Discontinued Medications


Atorvastatin Calcium (Atorvastatin Calcium) 40 Mg Tablet, 1 TAB PO DAILY for cho

lesterol, #30 Ref 5


   Prescribed by: ILENE GALLARDO MD on 1/15/19 1657


   Last Action: Continued on 5/31/20 2159 by NATALIE WATTS MD


Doxycycline Hyclate (Doxycycline Hyclate) 100 Mg Tablet, 1 TAB PO BID, #14


   Prescribed by: KOKO Sandoval on 3/12/20 0047


Prednisone (Prednisone **) 10 Mg Tablet, 10 MG PO UD for copd, #30 Ref 0


   Take 5 tablets by mouth daily for 2 days, then take 4 tablets by mouth daily 

for 2 days, then take 3 tablets by mouth daily for 2 days, then take 2 tablets 

by mouth daily for 2 days, then take 1 tablets by mouth daily for 2 days, then 

stop. 


   Prescribed by: ILENE GALLARDO MD on 1/16/19 1443





Justicifation of Admission Dx:


Justifications for Admission:


Justification of Admission Dx:  Yes


Stroke - Ischemic:  Stroke-Ischemic











CINDY WALDEN MD              Jun 2, 2020 15:19

## 2020-06-02 NOTE — PDOC
PROGRESS NOTES


Assessment


Problems


Medical Problems:


(1) TIA (transient ischemic attack)


Status: Acute  





Acute small right frontal lacunar infarct


Non-focal exam now


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


Diabetic neuropathy


Hypertension, diabetes, hyperlipidemia


Plan


Aspirin


Change to high-dose statin


Rehabilitation screening done,does not inpatient rehab


Discharge today


Discussed with patients daughter


Subjective


no complaints


Objective





Vital Signs








  Date Time  Temp Pulse Resp B/P (MAP) Pulse Ox O2 Delivery O2 Flow Rate FiO2


 


6/2/20 08:13  73  150/54    


 


6/2/20 08:00      Room Air 1.0 


 


6/2/20 07:00 97.5  18  97   





 97.5       














Intake and Output 


 


 6/2/20





 07:00


 


Intake Total 430 ml


 


Balance 430 ml


 


 


 


Intake Oral 430 ml


 


# Voids 3








PHYSICAL EXAM


Alert. Follows commands, does not speak English


PERRL.


EOMI.


CN: no focal findings.


Muscle tone: normal.


Muscle strength: 5/5


DTR:  2+


Plantar reflex:  flexor


Gait: not examined in bed.


Sensory exam:  stocking loss.


No cerebellar signs elicited.


Review of Relevant


I have reviewed the following items emerita (where applicable) has been applied.


Labs





Laboratory Tests








Test


 5/31/20


16:44 5/31/20


20:20 6/1/20


07:51 6/1/20


12:02


 


White Blood Count


 6.9 x10^3/uL


(4.0-11.0) 


 


 





 


Red Blood Count


 4.92 x10^6/uL


(3.50-5.40) 


 


 





 


Hemoglobin


 14.0 g/dL


(12.0-15.5) 


 


 





 


Hematocrit


 41.7 %


(36.0-47.0) 


 


 





 


Mean Corpuscular Volume 85 fL ()    


 


Mean Corpuscular Hemoglobin 29 pg (25-35)    


 


Mean Corpuscular Hemoglobin


Concent 34 g/dL


(31-37) 


 


 





 


Red Cell Distribution Width


 14.8 %


(11.5-14.5) 


 


 





 


Platelet Count


 122 x10^3/uL


(140-400) 


 


 





 


Neutrophils (%) (Auto) 58 % (31-73)    


 


Lymphocytes (%) (Auto) 31 % (24-48)    


 


Monocytes (%) (Auto) 8 % (0-9)    


 


Eosinophils (%) (Auto) 3 % (0-3)    


 


Basophils (%) (Auto) 1 % (0-3)    


 


Neutrophils # (Auto)


 4.0 x10^3/uL


(1.8-7.7) 


 


 





 


Lymphocytes # (Auto)


 2.1 x10^3/uL


(1.0-4.8) 


 


 





 


Monocytes # (Auto)


 0.5 x10^3/uL


(0.0-1.1) 


 


 





 


Eosinophils # (Auto)


 0.2 x10^3/uL


(0.0-0.7) 


 


 





 


Basophils # (Auto)


 0.1 x10^3/uL


(0.0-0.2) 


 


 





 


Prothrombin Time


 12.7 SEC


(11.7-14.0) 


 


 





 


Prothromb Time International


Ratio 1.0 (0.8-1.1) 


 


 


 





 


Sodium Level


 145 mmol/L


(136-145) 


 


 





 


Potassium Level


 4.3 mmol/L


(3.5-5.1) 


 


 





 


Chloride Level


 106 mmol/L


() 


 


 





 


Carbon Dioxide Level


 31 mmol/L


(21-32) 


 


 





 


Anion Gap 8 (6-14)    


 


Blood Urea Nitrogen


 35 mg/dL


(7-20) 


 


 





 


Creatinine


 1.6 mg/dL


(0.6-1.0) 


 


 





 


Estimated GFR


(Cockcroft-Gault) 31.5 


 


 


 





 


BUN/Creatinine Ratio 22 (6-20)    


 


Glucose Level


 101 mg/dL


(70-99) 


 


 





 


Hemoglobin A1c


 6.7 %


(4.8-5.6) 


 


 





 


Calcium Level


 9.0 mg/dL


(8.5-10.1) 


 


 





 


Total Bilirubin


 1.0 mg/dL


(0.2-1.0) 


 


 





 


Aspartate Amino Transf


(AST/SGOT) 25 U/L (15-37) 


 


 


 





 


Alanine Aminotransferase


(ALT/SGPT) 32 U/L (14-59) 


 


 


 





 


Alkaline Phosphatase


 64 U/L


() 


 


 





 


Troponin I Quantitative


 < 0.017 ng/mL


(0.000-0.055) 


 


 





 


Total Protein


 7.6 g/dL


(6.4-8.2) 


 


 





 


Albumin


 3.8 g/dL


(3.4-5.0) 


 


 





 


Albumin/Globulin Ratio 1.0 (1.0-1.7)    


 


Triglycerides Level


 242 mg/dL


(0-150) 


 


 





 


Cholesterol Level


 131 mg/dL


(0-200) 


 


 





 


LDL Cholesterol, Calculated


 46 mg/dL


(0-100) 


 


 





 


VLDL Cholesterol, Calculated


 48 mg/dL


(0-40) 


 


 





 


Non-HDL Cholesterol Calculated


 94 mg/dL


(0-129) 


 


 





 


HDL Cholesterol


 37 mg/dL


(40-60) 


 


 





 


Cholesterol/HDL Ratio 3.5    


 


Glucose (Fingerstick)


 


 80 mg/dL


(70-99) 85 mg/dL


(70-99) 178 mg/dL


(70-99)


 


Test


 6/1/20


16:22 6/1/20


21:12 6/2/20


07:38 





 


Glucose (Fingerstick)


 132 mg/dL


(70-99) 259 mg/dL


(70-99) 125 mg/dL


(70-99) 











Laboratory Tests








Test


 6/1/20


12:02 6/1/20


16:22 6/1/20


21:12 6/2/20


07:38


 


Glucose (Fingerstick)


 178 mg/dL


(70-99) 132 mg/dL


(70-99) 259 mg/dL


(70-99) 125 mg/dL


(70-99)








Medications





Current Medications


Insulin Human Lispro (HumaLOG) 0-7 UNITS TIDWMEALS SQ  Last administered on 

6/1/20at 12:18;  Start 6/1/20 at 08:00


Dextrose (Dextrose 50%-Water Syringe) 12.5 gm PRN Q15MIN  PRN IV SEE COMMENTS;  

Start 5/31/20 at 20:00


Albuterol Sulfate (Ventolin Neb Soln) 2.5 mg PRN Q4HRS  PRN INH SHORTNESS OF 

BREATH;  Start 5/31/20 at 22:00


Amlodipine Besylate (Norvasc) 5 mg DAILY PO  Last administered on 6/2/20at 

08:12;  Start 6/1/20 at 09:00


Aspirin (Ecotrin) 81 mg DAILYWBKFT PO  Last administered on 6/1/20at 08:32;  

Start 6/1/20 at 08:00;  Stop 6/1/20 at 08:51;  Status DC


Atorvastatin Calcium (Lipitor) 40 mg HS PO  Last administered on 6/1/20at 21:15;

 Start 5/31/20 at 22:30


Gabapentin (Neurontin) 100 mg PRN Q8HRS  PRN PO PAIN;  Start 5/31/20 at 22:00


Metoprolol Tartrate (Lopressor) 25 mg BID PO  Last administered on 6/2/20at 

08:13;  Start 5/31/20 at 22:30


Cetirizine HCl (ZyrTEC) 10 mg DAILY PO  Last administered on 6/2/20at 08:12;  

Start 6/1/20 at 09:00


Linagliptin (Tradjenta) 5 mg DAILY PO  Last administered on 6/2/20at 08:12;  

Start 6/1/20 at 09:00


Non-Formulary Medication (Tiotropium Bromide (Spiriva)) 1 cap DAILY IH ;  Start 

6/1/20 at 09:00;  Status UNV


Ondansetron HCl (Zofran) 4 mg PRN Q6HRS  PRN IVP NAUSEA/VOMITING;  Start 5/31/20

at 22:00


Acetaminophen (Tylenol) 650 mg PRN Q6HRS  PRN PO MILD PAIN / TEMP > 100.3'F;  

Start 5/31/20 at 22:00;  Stop 6/1/20 at 08:51;  Status DC


Heparin Sodium (Porcine) (Heparin Sodium) 5,000 unit Q8HRS SQ  Last administered

on 6/2/20at 06:14;  Start 5/31/20 at 22:00


Albuterol/ Ipratropium (Duoneb) 3 ml RTQID NEB  Last administered on 6/2/20at 0

7:42;  Start 6/1/20 at 08:00


Acetaminophen (Tylenol) 650 mg PRN Q6HRS  PRN PO TEMP > 100.4F;  Start 6/1/20 at

09:00


Acetaminophen (Tylenol Supp) 650 mg PRN Q4HRS  PRN LA TEMP > 100.4F;  Start 

6/1/20 at 09:00


Aspirin (Ecotrin) 325 mg DAILYWBKFT PO  Last administered on 6/2/20at 08:12;  

Start 6/2/20 at 08:00


Aspirin (Aspirin Rectal Supp) 300 mg PRN DAILY  PRN LA IF UNABLE TO TAKE PO;  

Start 6/1/20 at 09:00





Active Scripts


Active


Doxycycline Hyclate 100 Mg Tablet 1 Tab PO BID


Prednisone ** (Prednisone) 10 Mg Tablet 10 Mg PO UD


     Take 5 tablets by mouth daily for 2 days, then


     take 4 tablets by mouth daily for 2 days, then


     take 3 tablets by mouth daily for 2 days, then


     take 2 tablets by mouth daily for 2 days, then


     take 1 tablets by mouth daily for 2 days, then stop.


Tessalon Perle (Benzonatate) 100 Mg Capsule 1 Cap PO TID


Spiriva (Tiotropium [place]) 18 Mcg Cap.w.dev 1 Cap IH DAILY


Claritin (Loratadine) 10 Mg Tablet 1 Tab PO DAILY


Losartan Potassium 50 Mg Tablet 50 Mg PO DAILY 30 Days


Atorvastatin Calcium 40 Mg Tablet 1 Tab PO DAILY


Reported


Norvasc (Amlodipine Besylate) 5 Mg Tablet 5 Mg PO DAILY


Proair Hfa (Albuterol Sulfate) 8.5 Gm Hfa.aer.ad 1 Puff INH QID PRN


Januvia (Sitagliptin Phosphate) 50 Mg Tablet 1 Tab PO DAILY


Metoprolol Tartrate 25 Mg Tablet 25 Mg PO BID


Aspir 81 (Aspirin) 81 Mg Tablet.dr 1 Tab PO DAILY


Glipizide Er (Glipizide) 2.5 Mg Tab.er.24 4 Tab PO DAILY


     Gave this morning


     Take again tomorrow morning


Gabapentin ** (Gabapentin) 100 Mg Capsule 100 Mg PO PRN Q8HRS PRN


     Not given on this admission


     Take as previously directed


Metformin Hcl Er (Metformin Hcl) 500 Mg Tab.er.24h 2 Tab PO BID


     Gave this morning


     Take again tonight


Vitals/I & O





Vital Sign - Last 24 Hours








 6/1/20 6/1/20 6/1/20 6/1/20





 15:00 16:06 19:00 20:00


 


Temp 98.5  98.3 





 98.5  98.3 


 


Pulse 64  66 


 


Resp 18  15 


 


B/P (MAP) 100/52 (68)  141/70 (93) 


 


Pulse Ox 95  91 


 


O2 Delivery Room Air Room Air Room Air Room Air


 


    





    





 6/1/20 6/1/20 6/2/20 6/2/20





 21:15 23:00 03:00 07:00


 


Temp  98.3 98.0 97.5





  98.3 98.0 97.5


 


Pulse 66 58 60 59


 


Resp  15 15 18


 


B/P (MAP) 141/70 126/66 (86) 121/67 (85) 150/54 (86)


 


Pulse Ox  91 92 97


 


O2 Delivery  Room Air Room Air Room Air


 


    





    





 6/2/20 6/2/20 6/2/20 6/2/20





 07:43 08:00 08:12 08:13


 


Pulse   73 73


 


B/P (MAP)   150/54 150/54


 


O2 Delivery Room Air Room Air  


 


O2 Flow Rate  1.0  














Intake and Output   


 


 6/1/20 6/1/20 6/2/20





 15:00 23:00 07:00


 


Intake Total 200 ml 230 ml 


 


Balance 200 ml 230 ml 








Images


BRAIN W/O CONTRAST


 


History:Reason: TIA, left weakness,


 


Technique: Multiplanar, multi sequential MR imaging was performed of the 


brain without contrast.


 


Comparison: CT May 31, 2020. MRI February 27, 2016


 


Findings:


Acute infarct within the right centrum semiovale. No additional infarct. 


No intracranial hemorrhage. No hydrocephalus.


 


Chronic small basal ganglia and left thalamic lacunar infarcts. Partially 


empty sella, often incidental.


 


Moderate focal and confluent foci of FLAIR hyperintensity within the 


hemispheric white matter and andrzej, most often due to chronic microvascular


ischemia. Right anterior parafalcine extra-axial mass partially calcified 


measures 2.3 x 2.3 cm, unchanged.


 


Punctate gradient hypointensity within the right medial frontal lobe, 


likely related to prior microhemorrhage.


 


Imaged orbits are unremarkable. Imaged paranasal sinuses and mastoid air 


cells are clear.


 


Impression:


1.  Acute small right frontal infarct.


2.  Unchanged right anterior parasagittal extra-axial mass, favor 


meningioma.


3.  Moderate sequelae of chronic microvascular ischemia.





DOPPLER CAROTID BILAT


 


History: Reason: TIA, left paresis; / Spl. Instructions:  / History: 


 


COMPARISON: None


 


Technique: Duplex sonography of the cervical portion of both carotid 


arteries was performed. Real-time grayscale, color flow Doppler, and 


Doppler spectral waveform analysis is performed.


 


PQRS Compliance Statement - Stenosis calculations for CT, MR and 


conventional angiography are based upon measurement of the distal ICA 


diameter in accordance with the NASCET methodology.  Stenosis calculations


for carotid ultrasound studies are derived from validated velocity 


criteria which are known to correlate with the NASCET methodology.


 


Findings:


Right side:


Peak systolic flow velocity of the CCA is 80 cm/sec.


Peak systolic flow velocity of the ICA is 123 cm/sec.


The ICA/CCA ratio is 6.8.


Peak end diastolic flow velocity of the ICA is 35 cm/sec.


The peak systolic velocity of the ECA is 51 cm/sec.


 


Moderate scattered atheromatous plaque most prominent within the carotid 


bifurcation. Enlarged right common carotid artery compared to the left.


 


Left side:


Peak systolic flow velocity of the CCA is 46 cm/sec.


Peak systolic flow velocity of the ICA is 87 cm/sec.


The ICA/CCA ratio is 1.9.


Peak end diastolic flow velocity of the ICA is 23 cm/sec. 


Peak systolic flow velocity of the ECA is 41 cm/sec.


 


Moderate scattered atheromatous plaque most prominent within the carotid 


bifurcation.


 


Vertebral arteries: Bilateral vertebral arteries demonstrate antegrade 


flow.


 


IMPRESSION:


1.  Elevated right internal carotid to common carotid artery velocity 


ratio, may relate to increased size of the common carotid artery 


contributing to decreased velocity. No luminal stenosis seen on grayscale 


imaging. No velocity elevation to suggest stenosis.


2.  Moderate bilateral atheromatous plaque.





Echocardiogram:


 LEFT VENTRICLE 


The left ventricle is normal size. There is normal left ventricular wall 

thickness. The left ventricular systolic function is normal. The ejection 

fraction is 55-60%. There is normal LV segmental wall motion.





 RIGHT VENTRICLE 


The right ventricle is normal size. The right ventricular systolic function is 

normal.





 ATRIA 


The left atrium is mildly dilated. The right atrium is mildly dilated. The 

interatrial septum is intact with no evidence for an atrial septal defect or 

patent foramen ovale as noted on 2-D or Doppler imaging.





 AORTIC VALVE 


The aortic valve is thickened but opens well. Doppler and Color Flow revealed 

mild aortic regurgitation. There is no significant aortic valvular stenosis. 





 MITRAL VALVE 


The mitral valve is thickened but opens well. There is no evidence of mitral 

valve prolapse. There is no mitral valve stenosis. Doppler and Color-flow 

revealed trace mitral regurgitation.





 TRICUSPID VALVE 


The tricuspid valve is normal in structure and function. Doppler and Color Flow 

revealed mild tricuspid regurgitation with an estimated PAP of 48 mmHg. There is

no tricuspid valve stenosis.





 PULMONIC VALVE 


The pulmonary valve is normal in structure and function. Doppler and Color Flow 

revealed trace to mild pulmonic valvular regurgitation.





 GREAT VESSELS 


The aortic root is normal in size. The ascending aorta is normal in size. The 

IVC is dilated and collapses >50% with inspiration.





 PERICARDIAL EFFUSION 


There is no evidence of significant pericardial effusion.





Critical Notification


Critical Value: No





<Conclusion>


The left ventricular systolic function is normal.


The ejection fraction is 55-60%.


There is normal LV segmental wall motion.


Mild aortic regurgitation.


Trace mitral regurgitation.


Mild tricuspid regurgitation with an estimated PAP of 48 mmHg.


There is no evidence of significant pericardial effusion.





Justicifation of Admission Dx:


Justifications for Admission:


Justification of Admission Dx:  Yes


Stroke - Ischemic:  Stroke-Ischemic











ANDI BURNETT MD            Jun 2, 2020 09:43

## 2020-06-02 NOTE — NUR
Discharge Note:



GINA JETT [facility]



Discharge instructions and discharge home medications reviewed with Patient and a copy 
given. All questions have been answered and understanding verbalized. 



The following instructions and handouts were given: f/u with pcp within one week. 



Discontinued lines and drains: Peripheral IV intact.



Patient discharged to Home or Self Care with Family Member via Wheelchair

## 2021-05-20 ENCOUNTER — HOSPITAL ENCOUNTER (OUTPATIENT)
Dept: HOSPITAL 61 - US | Age: 76
End: 2021-05-20
Attending: INTERNAL MEDICINE
Payer: MEDICAID

## 2021-05-20 DIAGNOSIS — N17.9: Primary | ICD-10-CM

## 2021-05-20 PROCEDURE — 76770 US EXAM ABDO BACK WALL COMP: CPT

## 2021-05-20 NOTE — RAD
EXAM: RENAL ULTRASOUND



CLINICAL HISTORY: Reason: ACUTE RENAL FAILURE / Spl. Instructions:  / History: 



COMPARISON: None available.



TECHNIQUE: Ultrasound examination of the bilateral kidneys and urinary bladder was performed.



FINDINGS:

This exam is mildly limited given respiratory motion artifacts.



The right kidney measures 10.1 cm in bipolar length. The renal cortex is normal in thickness. Renal e
chogenicity is normal.  There is no evidence for hydronephrosis, shadowing renal calculus or focal ab
normality  .



The left kidney measures 8.2 cm in bipolar length. The renal cortex is normal in thickness. Renal ech
ogenicity is normal. There is no evidence for hydronephrosis, shadowing renal calculus or focal abnor
mality.  



Images of the partially filled urinary bladder are unremarkable. Bladder volume measures 144 mm. 



IMPRESSION: 

Normal sonographic survey of the kidneys and bladder.



Electronically signed by: Robby Ho MD (5/20/2021 1:17 PM) MAR[facility]